# Patient Record
Sex: FEMALE | Race: WHITE | Employment: UNEMPLOYED | URBAN - METROPOLITAN AREA
[De-identification: names, ages, dates, MRNs, and addresses within clinical notes are randomized per-mention and may not be internally consistent; named-entity substitution may affect disease eponyms.]

---

## 2021-03-04 ENCOUNTER — HOSPITAL ENCOUNTER (EMERGENCY)
Age: 31
Discharge: HOME | End: 2021-03-04
Attending: EMERGENCY MEDICINE
Payer: COMMERCIAL

## 2021-03-04 ENCOUNTER — APPOINTMENT (OUTPATIENT)
Dept: CT IMAGING | Age: 31
End: 2021-03-04
Payer: COMMERCIAL

## 2021-03-04 VITALS
HEIGHT: 63 IN | DIASTOLIC BLOOD PRESSURE: 66 MMHG | SYSTOLIC BLOOD PRESSURE: 124 MMHG | OXYGEN SATURATION: 97 % | TEMPERATURE: 98.7 F | HEART RATE: 80 BPM | BODY MASS INDEX: 27.82 KG/M2 | WEIGHT: 157 LBS | RESPIRATION RATE: 20 BRPM

## 2021-03-04 DIAGNOSIS — N20.0 KIDNEY STONE: Primary | ICD-10-CM

## 2021-03-04 DIAGNOSIS — N12 PYELONEPHRITIS: ICD-10-CM

## 2021-03-04 LAB
ANION GAP SERPL CALCULATED.3IONS-SCNC: 8 MEQ/L (ref 9–15)
BACTERIA: ABNORMAL /HPF
BASOPHILS ABSOLUTE: 0 K/UL (ref 0–0.2)
BASOPHILS RELATIVE PERCENT: 0.3 %
BILIRUBIN URINE: NEGATIVE
BLOOD, URINE: ABNORMAL
BUN BLDV-MCNC: 11 MG/DL (ref 6–20)
CALCIUM SERPL-MCNC: 9.4 MG/DL (ref 8.5–9.9)
CHLORIDE BLD-SCNC: 102 MEQ/L (ref 95–107)
CLARITY: CLEAR
CO2: 28 MEQ/L (ref 20–31)
COLOR: YELLOW
CREAT SERPL-MCNC: 0.63 MG/DL (ref 0.5–0.9)
EOSINOPHILS ABSOLUTE: 0.1 K/UL (ref 0–0.7)
EOSINOPHILS RELATIVE PERCENT: 1.3 %
EPITHELIAL CELLS, UA: ABNORMAL /HPF (ref 0–5)
GFR AFRICAN AMERICAN: >60
GFR NON-AFRICAN AMERICAN: >60
GLUCOSE BLD-MCNC: 78 MG/DL (ref 70–99)
GLUCOSE URINE: NEGATIVE MG/DL
HCG(URINE) PREGNANCY TEST: NEGATIVE
HCG, URINE, POC: NEGATIVE
HCT VFR BLD CALC: 37.9 % (ref 37–47)
HEMOGLOBIN: 13 G/DL (ref 12–16)
HYALINE CASTS: ABNORMAL /HPF (ref 0–5)
KETONES, URINE: NEGATIVE MG/DL
LEUKOCYTE ESTERASE, URINE: ABNORMAL
LYMPHOCYTES ABSOLUTE: 2.4 K/UL (ref 1–4.8)
LYMPHOCYTES RELATIVE PERCENT: 29.9 %
Lab: NORMAL
MCH RBC QN AUTO: 30 PG (ref 27–31.3)
MCHC RBC AUTO-ENTMCNC: 34.3 % (ref 33–37)
MCV RBC AUTO: 87.5 FL (ref 82–100)
MONOCYTES ABSOLUTE: 0.6 K/UL (ref 0.2–0.8)
MONOCYTES RELATIVE PERCENT: 7.6 %
NEGATIVE QC PASS/FAIL: NORMAL
NEUTROPHILS ABSOLUTE: 5 K/UL (ref 1.4–6.5)
NEUTROPHILS RELATIVE PERCENT: 60.9 %
NITRITE, URINE: NEGATIVE
PDW BLD-RTO: 15.5 % (ref 11.5–14.5)
PH UA: 5.5 (ref 5–9)
PLATELET # BLD: 263 K/UL (ref 130–400)
POSITIVE QC PASS/FAIL: NORMAL
POTASSIUM SERPL-SCNC: 4.1 MEQ/L (ref 3.4–4.9)
PROTEIN UA: NEGATIVE MG/DL
RBC # BLD: 4.33 M/UL (ref 4.2–5.4)
RBC UA: ABNORMAL /HPF (ref 0–5)
SODIUM BLD-SCNC: 138 MEQ/L (ref 135–144)
SPECIFIC GRAVITY UA: 1.02 (ref 1–1.03)
UROBILINOGEN, URINE: 0.2 E.U./DL
WBC # BLD: 8.1 K/UL (ref 4.8–10.8)
WBC UA: ABNORMAL /HPF (ref 0–5)

## 2021-03-04 PROCEDURE — 6360000002 HC RX W HCPCS: Performed by: EMERGENCY MEDICINE

## 2021-03-04 PROCEDURE — 84703 CHORIONIC GONADOTROPIN ASSAY: CPT

## 2021-03-04 PROCEDURE — 2580000003 HC RX 258: Performed by: EMERGENCY MEDICINE

## 2021-03-04 PROCEDURE — 87077 CULTURE AEROBIC IDENTIFY: CPT

## 2021-03-04 PROCEDURE — 80048 BASIC METABOLIC PNL TOTAL CA: CPT

## 2021-03-04 PROCEDURE — 96365 THER/PROPH/DIAG IV INF INIT: CPT

## 2021-03-04 PROCEDURE — 87086 URINE CULTURE/COLONY COUNT: CPT

## 2021-03-04 PROCEDURE — 36415 COLL VENOUS BLD VENIPUNCTURE: CPT

## 2021-03-04 PROCEDURE — 81001 URINALYSIS AUTO W/SCOPE: CPT

## 2021-03-04 PROCEDURE — 87186 SC STD MICRODIL/AGAR DIL: CPT

## 2021-03-04 PROCEDURE — 85025 COMPLETE CBC W/AUTO DIFF WBC: CPT

## 2021-03-04 PROCEDURE — 99284 EMERGENCY DEPT VISIT MOD MDM: CPT

## 2021-03-04 PROCEDURE — 74176 CT ABD & PELVIS W/O CONTRAST: CPT

## 2021-03-04 RX ORDER — CIPROFLOXACIN 500 MG/1
500 TABLET, FILM COATED ORAL 2 TIMES DAILY
Qty: 20 TABLET | Refills: 0 | Status: SHIPPED | OUTPATIENT
Start: 2021-03-04 | End: 2021-03-14

## 2021-03-04 RX ORDER — ONDANSETRON 4 MG/1
4 TABLET, ORALLY DISINTEGRATING ORAL 3 TIMES DAILY PRN
Qty: 12 TABLET | Refills: 0 | Status: SHIPPED | OUTPATIENT
Start: 2021-03-04

## 2021-03-04 RX ORDER — HYDROCODONE BITARTRATE AND ACETAMINOPHEN 5; 325 MG/1; MG/1
1 TABLET ORAL EVERY 6 HOURS PRN
Qty: 12 TABLET | Refills: 0 | Status: SHIPPED | OUTPATIENT
Start: 2021-03-04 | End: 2021-03-07

## 2021-03-04 RX ORDER — ESCITALOPRAM OXALATE 10 MG/1
10 TABLET ORAL DAILY
COMMUNITY

## 2021-03-04 RX ADMIN — CEFTRIAXONE SODIUM 1000 MG: 1 INJECTION, POWDER, FOR SOLUTION INTRAMUSCULAR; INTRAVENOUS at 15:49

## 2021-03-04 RX ADMIN — DEXTROSE AND SODIUM CHLORIDE 1000 ML: 5; 450 INJECTION, SOLUTION INTRAVENOUS at 15:29

## 2021-03-04 ASSESSMENT — ENCOUNTER SYMPTOMS
BACK PAIN: 1
COLOR CHANGE: 0
SORE THROAT: 0
EYE PAIN: 0
SHORTNESS OF BREATH: 0
CHEST TIGHTNESS: 0
NAUSEA: 0
SINUS PAIN: 0
EYE REDNESS: 0
ABDOMINAL PAIN: 0
DIARRHEA: 0
COUGH: 0
VOMITING: 0

## 2021-03-04 ASSESSMENT — PAIN DESCRIPTION - ONSET: ONSET: ON-GOING

## 2021-03-04 ASSESSMENT — PAIN SCALES - GENERAL: PAINLEVEL_OUTOF10: 8

## 2021-03-04 NOTE — ED NOTES
Pt awake and alert. No s/s of distress noted at this time. No concerns or needs at this time. Will continue to monitor.         Becca Ward RN  03/04/21 1600

## 2021-03-04 NOTE — ED PROVIDER NOTES
[address] [facility] ED  EMERGENCY DEPARTMENT ENCOUNTER      Pt Name: Beatriz Cabral  MRN: [id number]  [place] 1990  Date of evaluation: 3/4/2021  Provider: Roberta Alamo DO    CHIEF COMPLAINT       Chief Complaint   Patient presents with    Flank Pain     onset last night, finished macrobid yesterday am for UTI     Chief complaint: Left flank pain  History of chief complaint: This 57-year-old female presents the emergency department complaining of onset with left flank pain last evening. Patient states was just a very slight dull ache that progressively worsened through the night and increased this morning. Patient denies any radiation of pain around to the abdomen or down into the groin area. Patient states she does have a history of kidney stones with similar type pains. Patient denies any associated nausea vomiting fevers chills weak or dizzy. Patient states she did have a little constipation earlier in the week but that is since resolved. Patient states she has had recent recurrent urinary tract infection initially diagnosed at the beginning of February, did a course of Bactrim with improvement. Symptoms with burning with urination recurred approximately 1 week ago saw her doctor was diagnosed again with a urinary tract infection and just finishing a 7-day course of Macrobid yesterday. Patient states no further recurrent burning with urination has not noticed any blood in the urine. Patient denies any injury or trauma to the back. States she is otherwise been well no cough cold congestion fevers chills no chest pain palpitation short of breath weak or dizzy. Nursing Notes were reviewed. REVIEW OF SYSTEMS    (2-9 systems for level 4, 10 or more for level 5)     Review of Systems   Constitutional: Negative for chills, fatigue and fever. HENT: Negative for congestion, sinus pain and sore throat. Eyes: Negative for pain and redness.    Respiratory: Negative for cough, chest tightness and shortness of breath. Cardiovascular: Negative for chest pain, palpitations and leg swelling. Gastrointestinal: Negative for abdominal pain, diarrhea, nausea and vomiting. Genitourinary: Positive for flank pain. Negative for difficulty urinating, dysuria, frequency, hematuria and pelvic pain. Musculoskeletal: Positive for back pain. Negative for myalgias. Skin: Negative for color change and rash. Neurological: Negative for weakness, light-headedness and numbness. Hematological: Negative for adenopathy. Except as noted above the remainder of the review of systems was reviewed and negative. PAST MEDICAL HISTORY     Past Medical History:   Diagnosis Date    Depression          SURGICAL HISTORY     History reviewed. No pertinent surgical history. CURRENT MEDICATIONS       Previous Medications    ESCITALOPRAM (LEXAPRO) 10 MG TABLET    Take 10 mg by mouth daily       ALLERGIES     Erythromycin and Sulfa antibiotics    FAMILY HISTORY     History reviewed. No pertinent family history.        SOCIAL HISTORY       Social History     Socioeconomic History    Marital status:      Spouse name: None    Number of children: None    Years of education: None    Highest education level: None   Occupational History    None   Social Needs    Financial resource strain: None    Food insecurity     Worry: None     Inability: None    Transportation needs     Medical: None     Non-medical: None   Tobacco Use    Smoking status: Current Every Day Smoker     Packs/day: 0.50     Types: Cigarettes    Smokeless tobacco: Never Used   Substance and Sexual Activity    Alcohol use: None     Comment: never    Drug use: Never    Sexual activity: None   Lifestyle    Physical activity     Days per week: None     Minutes per session: None    Stress: None   Relationships    Social connections     Talks on phone: None     Gets together: None     Attends [employer] service: None     Active member of emergency physician with the below findings:    Interpretation per the Radiologist below, if available at the time of this note:    CT ABDOMEN PELVIS WO CONTRAST Additional Contrast? None   Final Result   1. BILATERAL NEPHROLITHIASIS. 2. MILD LEFT-SIDED HYDRONEPHROSIS. 3. LEFT CYSTIC ADNEXAL MASSES. LIKELY OVARIAN. IF CLINICALLY INDICATED CONSIDER FOLLOW-UP ULTRASOUND         All CT scans at this facility use dose modulation, iterative reconstruction, and/or weight based dosing when appropriate to reduce radiation dose to as low as reasonably achievable. LABS:  Labs Reviewed   BASIC METABOLIC PANEL - Abnormal; Notable for the following components:       Result Value    Anion Gap 8 (*)     All other components within normal limits   CBC WITH AUTO DIFFERENTIAL - Abnormal; Notable for the following components:    RDW 15.5 (*)     All other components within normal limits   URINALYSIS - Abnormal; Notable for the following components:    Blood, Urine TRACE (*)     Leukocyte Esterase, Urine MODERATE (*)     All other components within normal limits   MICROSCOPIC URINALYSIS - Abnormal; Notable for the following components:    Bacteria, UA FEW (*)     WBC, UA  (*)     RBC, UA 6-10 (*)     All other components within normal limits   CULTURE, URINE   PREGNANCY, URINE   POC PREGNANCY UR-QUAL       All other labs were within normal range or not returned as of this dictation.     EMERGENCY DEPARTMENT COURSE and DIFFERENTIAL DIAGNOSIS/MDM:   Vitals:    Vitals:    03/04/21 1423 03/04/21 1641 03/04/21 1731   BP: 137/78 120/74 124/61   Pulse: 79 83 80   Resp: 18 20    Temp: 98.7 °F (37.1 °C)     TempSrc: Oral     SpO2: 99% 100% 97%   Weight: 157 lb (71.2 kg)     Height: 5' 3\" (1.6 m)         Treatment and course: Patient had an IV established normal saline 1 L was given along with Toradol 30 mg IV with improvement, Rocephin 1 g IV was initiated with urine findings and left hydronephrosis concerning for the possibility of some pyelonephritis. A call was placed out to urology in case was discussed with Dr. Shania Pineda with concern for recurrent infectious findings and kidney stone with the left hydronephrosis, recommended patient home on Cipro and will follow up in the office    Patient is well-appearing nontoxic no leukocytosis afebrile, agreeable and comfortable with the plan for outpatient follow-up with urology      FINAL IMPRESSION      1. Kidney stone    2. Pyelonephritis          DISPOSITION/PLAN   DISPOSITION Decision To Discharge 03/04/2021 05:52:12 PM  Patient discharged home with family given home-going prescription for Cipro 10 days Zofran and Vicodin No. 12.  Patient advised to increase fluid monitor closely and return if any change or worsening increasing pain high fever persistent vomiting weak or dizzy feeling. Patient to call urology office in the morning for an appointment in the next 2 to 3 days. PATIENT REFERRED TO:  Aleshia Cardoza MD  [address]  [phone]    In 2 days        DISCHARGE MEDICATIONS:  New Prescriptions    CIPROFLOXACIN (CIPRO) 500 MG TABLET    Take 1 tablet by mouth 2 times daily for 10 days    HYDROCODONE-ACETAMINOPHEN (NORCO) 5-325 MG PER TABLET    Take 1 tablet by mouth every 6 hours as needed for Pain for up to 3 days. Intended supply: 3 days. Take lowest dose possible to manage pain    ONDANSETRON (ZOFRAN-ODT) 4 MG DISINTEGRATING TABLET    Take 1 tablet by mouth 3 times daily as needed for Nausea or Vomiting     Controlled Substances Monitoring:     No flowsheet data found.     (Please note that portions of this note were completed with a voice recognition program.  Efforts were made to edit the dictations but occasionally words are mis-transcribed.)    Rc Ch DO (electronically signed)  Attending Emergency Physician          Rc Ch DO  03/04/21 1800

## 2021-03-04 NOTE — ED NOTES
Discharge education reviewed verbally and in writing. Instructed to follow up with PCP and come back to the ED with any new or worsening symptoms. No questions or concerns at this time. No s/s of distress noted at this time.         Carlito Gonsalves RN  03/04/21 2362

## 2021-03-04 NOTE — ED NOTES
Pt awake and alert. No s/s of distress noted at this time. No concerns or needs at this time. Will continue to monitor.         Spencer Gomez RN  03/04/21 8494

## 2021-03-04 NOTE — ED NOTES
Pt's urine specimen collected.    Tubes labeled and sent to lab  Tube system down, specimen walked by ed staff     Kennedy Brady RN  03/04/21 6413

## 2021-03-07 ENCOUNTER — HOSPITAL ENCOUNTER (EMERGENCY)
Age: 31
Discharge: HOME | End: 2021-03-07
Attending: FAMILY MEDICINE
Payer: COMMERCIAL

## 2021-03-07 VITALS
OXYGEN SATURATION: 100 % | BODY MASS INDEX: 26.57 KG/M2 | WEIGHT: 150 LBS | TEMPERATURE: 98.7 F | RESPIRATION RATE: 16 BRPM | SYSTOLIC BLOOD PRESSURE: 120 MMHG | DIASTOLIC BLOOD PRESSURE: 73 MMHG | HEART RATE: 76 BPM

## 2021-03-07 DIAGNOSIS — N39.0 ACUTE UTI (URINARY TRACT INFECTION): Primary | ICD-10-CM

## 2021-03-07 LAB
ALBUMIN SERPL-MCNC: 4.6 G/DL (ref 3.5–4.6)
ALP BLD-CCNC: 52 U/L (ref 40–130)
ALT SERPL-CCNC: 30 U/L (ref 0–33)
ANION GAP SERPL CALCULATED.3IONS-SCNC: 10 MEQ/L (ref 9–15)
AST SERPL-CCNC: 20 U/L (ref 0–35)
BACTERIA: NEGATIVE /HPF
BASOPHILS ABSOLUTE: 0 K/UL (ref 0–0.2)
BASOPHILS RELATIVE PERCENT: 0.3 %
BILIRUB SERPL-MCNC: 0.3 MG/DL (ref 0.2–0.7)
BILIRUBIN URINE: NEGATIVE
BLOOD, URINE: NEGATIVE
BUN BLDV-MCNC: 12 MG/DL (ref 6–20)
CALCIUM SERPL-MCNC: 10.1 MG/DL (ref 8.5–9.9)
CHLORIDE BLD-SCNC: 103 MEQ/L (ref 95–107)
CLARITY: CLEAR
CO2: 26 MEQ/L (ref 20–31)
COLOR: ABNORMAL
CREAT SERPL-MCNC: 0.59 MG/DL (ref 0.5–0.9)
EOSINOPHILS ABSOLUTE: 0.1 K/UL (ref 0–0.7)
EOSINOPHILS RELATIVE PERCENT: 1.3 %
EPITHELIAL CELLS, UA: ABNORMAL /HPF (ref 0–5)
GFR AFRICAN AMERICAN: >60
GFR NON-AFRICAN AMERICAN: >60
GLOBULIN: 3.3 G/DL (ref 2.3–3.5)
GLUCOSE BLD-MCNC: 65 MG/DL (ref 70–99)
GLUCOSE URINE: NEGATIVE MG/DL
HCT VFR BLD CALC: 40.6 % (ref 37–47)
HEMOGLOBIN: 14.1 G/DL (ref 12–16)
HYALINE CASTS: ABNORMAL /HPF (ref 0–5)
KETONES, URINE: NEGATIVE MG/DL
LEUKOCYTE ESTERASE, URINE: ABNORMAL
LYMPHOCYTES ABSOLUTE: 2.1 K/UL (ref 1–4.8)
LYMPHOCYTES RELATIVE PERCENT: 28.1 %
MCH RBC QN AUTO: 30.4 PG (ref 27–31.3)
MCHC RBC AUTO-ENTMCNC: 34.9 % (ref 33–37)
MCV RBC AUTO: 87.2 FL (ref 82–100)
MONOCYTES ABSOLUTE: 0.5 K/UL (ref 0.2–0.8)
MONOCYTES RELATIVE PERCENT: 6.8 %
NEUTROPHILS ABSOLUTE: 4.6 K/UL (ref 1.4–6.5)
NEUTROPHILS RELATIVE PERCENT: 63.5 %
NITRITE, URINE: NEGATIVE
ORGANISM: ABNORMAL
PDW BLD-RTO: 15.4 % (ref 11.5–14.5)
PH UA: 8 (ref 5–9)
PLATELET # BLD: 275 K/UL (ref 130–400)
POTASSIUM SERPL-SCNC: 3.7 MEQ/L (ref 3.4–4.9)
PROTEIN UA: NEGATIVE MG/DL
RBC # BLD: 4.66 M/UL (ref 4.2–5.4)
RBC UA: ABNORMAL /HPF (ref 0–5)
SODIUM BLD-SCNC: 139 MEQ/L (ref 135–144)
SPECIFIC GRAVITY UA: 1 (ref 1–1.03)
TOTAL PROTEIN: 7.9 G/DL (ref 6.3–8)
URINE CULTURE, ROUTINE: ABNORMAL
URINE REFLEX TO CULTURE: ABNORMAL
UROBILINOGEN, URINE: 0.2 E.U./DL
WBC # BLD: 7.3 K/UL (ref 4.8–10.8)
WBC UA: ABNORMAL /HPF (ref 0–5)

## 2021-03-07 PROCEDURE — 80053 COMPREHEN METABOLIC PANEL: CPT

## 2021-03-07 PROCEDURE — 85025 COMPLETE CBC W/AUTO DIFF WBC: CPT

## 2021-03-07 PROCEDURE — 2580000003 HC RX 258: Performed by: FAMILY MEDICINE

## 2021-03-07 PROCEDURE — 81001 URINALYSIS AUTO W/SCOPE: CPT

## 2021-03-07 PROCEDURE — 36415 COLL VENOUS BLD VENIPUNCTURE: CPT

## 2021-03-07 PROCEDURE — 99284 EMERGENCY DEPT VISIT MOD MDM: CPT

## 2021-03-07 PROCEDURE — 6360000002 HC RX W HCPCS: Performed by: FAMILY MEDICINE

## 2021-03-07 PROCEDURE — 96365 THER/PROPH/DIAG IV INF INIT: CPT

## 2021-03-07 RX ORDER — NITROFURANTOIN 25; 75 MG/1; MG/1
100 CAPSULE ORAL 2 TIMES DAILY
Qty: 10 CAPSULE | Refills: 0 | Status: SHIPPED | OUTPATIENT
Start: 2021-03-07 | End: 2021-03-12

## 2021-03-07 RX ADMIN — CEFTRIAXONE SODIUM 1000 MG: 1 INJECTION, POWDER, FOR SOLUTION INTRAMUSCULAR; INTRAVENOUS at 11:38

## 2021-03-07 NOTE — ED PROVIDER NOTES
[address] [facility] ED  eMERGENCY dEPARTMENT eNCOUnter      Pt Name: Brian Hunter  MRN: [id number]  [place] 1990  Date of evaluation: 3/7/2021  Provider: Adrian Gonsales MD    CHIEF COMPLAINT       Chief Complaint   Patient presents with    Urinary Tract Infection     called squad  because she thinks she has a uti         HISTORY OF PRESENT ILLNESS   (Location/Symptom, Timing/Onset,Context/Setting, Quality, Duration, Modifying Factors, Severity)  Note limiting factors. Brian Hunter is a 32 y.o. female who presents to the emergency department UTI    3years old female patient currently lives in [facility] presented to the ER today to follow-up on a UTI with seen on March 4 for a UTI was given Rocephin in the ER sent home on Cipro states she continued to have severe dysuria burning this morning culture reviewed was positive for E. coli that was unfortunately resistant to Cipro but sensitive to cephalosporins and Macrobid. The history is provided by the patient. Urinary Tract Infection  This is a new problem. The current episode started more than 1 week ago. The problem occurs constantly. The problem has not changed since onset. Nothing aggravates the symptoms. Nothing relieves the symptoms. She has tried nothing for the symptoms. The treatment provided no relief. NursingNotes were reviewed. REVIEW OF SYSTEMS    (2-9 systems for level 4, 10 or more for level 5)     Review of Systems    Except as noted above the remainder of the review of systems was reviewed and negative. PAST MEDICAL HISTORY     Past Medical History:   Diagnosis Date    Depression          SURGICALHISTORY     No past surgical history on file.       CURRENT MEDICATIONS       Previous Medications    CIPROFLOXACIN (CIPRO) 500 MG TABLET    Take 1 tablet by mouth 2 times daily for 10 days    ESCITALOPRAM (LEXAPRO) 10 MG TABLET    Take 10 mg by mouth daily    HYDROCODONE-ACETAMINOPHEN (NORCO) 5-325 MG PER TABLET    Take 1 tablet by mouth every 6 hours as needed for Pain for up to 3 days. Intended supply: 3 days. Take lowest dose possible to manage pain    ONDANSETRON (ZOFRAN-ODT) 4 MG DISINTEGRATING TABLET    Take 1 tablet by mouth 3 times daily as needed for Nausea or Vomiting       ALLERGIES     Erythromycin and Sulfa antibiotics    FAMILY HISTORY     No family history on file.        SOCIAL HISTORY       Social History     Socioeconomic History    Marital status:      Spouse name: Not on file    Number of children: Not on file    Years of education: Not on file    Highest education level: Not on file   Occupational History    Not on file   Social Needs    Financial resource strain: Not on file    Food insecurity     Worry: Not on file     Inability: Not on file    Transportation needs     Medical: Not on file     Non-medical: Not on file   Tobacco Use    Smoking status: Current Every Day Smoker     Packs/day: 0.50     Types: Cigarettes    Smokeless tobacco: Never Used   Substance and Sexual Activity    Alcohol use: Not on file     Comment: never    Drug use: Never    Sexual activity: Not on file   Lifestyle    Physical activity     Days per week: Not on file     Minutes per session: Not on file    Stress: Not on file   Relationships    Social connections     Talks on phone: Not on file     Gets together: Not on file     Attends Hinduism service: Not on file     Active member of club or organization: Not on file     Attends meetings of clubs or organizations: Not on file     Relationship status: Not on file    Intimate partner violence     Fear of current or ex partner: Not on file     Emotionally abused: Not on file     Physically abused: Not on file     Forced sexual activity: Not on file   Other Topics Concern    Not on file   Social History Narrative    Not on file       SCREENINGS      @[facility]([id number])@      PHYSICAL EXAM    (up to 7 for level 4, 8 or more for level 5)     ED Triage Vitals [03/07/21 1124]   BP Temp Temp src Pulse Resp SpO2 Height Weight   138/74 98.7 °F (37.1 °C) -- 76 16 100 % -- 150 lb (68 kg)       Physical Exam  Vitals signs and nursing note reviewed. Constitutional:       Appearance: Normal appearance. She is well-developed. HENT:      Head: Normocephalic and atraumatic. Right Ear: Tympanic membrane and external ear normal.      Left Ear: Tympanic membrane and external ear normal.      Nose: Nose normal.      Mouth/Throat:      Mouth: Mucous membranes are moist.      Pharynx: Oropharynx is clear. Eyes:      Extraocular Movements: Extraocular movements intact. Pupils: Pupils are equal, round, and reactive to light. Neck:      Musculoskeletal: Normal range of motion and neck supple. Cardiovascular:      Rate and Rhythm: Normal rate and regular rhythm. Heart sounds: Normal heart sounds. Pulmonary:      Effort: Pulmonary effort is normal. No respiratory distress. Breath sounds: Normal breath sounds. No stridor. No wheezing, rhonchi or rales. Chest:      Chest wall: No tenderness. Abdominal:      General: Abdomen is flat. Bowel sounds are normal.      Palpations: Abdomen is soft. Tenderness: There is no right CVA tenderness or left CVA tenderness. Musculoskeletal: Normal range of motion. Skin:     General: Skin is warm and dry. Neurological:      General: No focal deficit present. Mental Status: She is alert and oriented to person, place, and time. Cranial Nerves: No cranial nerve deficit. Sensory: No sensory deficit. Motor: No weakness or abnormal muscle tone. Coordination: Coordination normal.      Gait: Gait normal.      Deep Tendon Reflexes: Reflexes normal.   Psychiatric:         Behavior: Behavior normal.         Thought Content:  Thought content normal.         Judgment: Judgment normal.         DIAGNOSTIC RESULTS     EKG: All EKG's are interpreted by the Emergency Department Physician who either signs or Co-signsthis chart in the absence of a cardiologist.        RADIOLOGY:   Creed Hawking such as CT, Ultrasound and MRI are read by the radiologist. Plain radiographic images are visualized and preliminarily interpreted by the emergency physician with the below findings:        Interpretation per the Radiologist below, if available at the time ofthis note:    No orders to display         ED BEDSIDE ULTRASOUND:   Performed by ED Physician - none    LABS:  Labs Reviewed   COMPREHENSIVE METABOLIC PANEL - Abnormal; Notable for the following components:       Result Value    Glucose 65 (*)     Calcium 10.1 (*)     All other components within normal limits   CBC WITH AUTO DIFFERENTIAL - Abnormal; Notable for the following components:    RDW 15.4 (*)     All other components within normal limits   URINE RT REFLEX TO CULTURE - Abnormal; Notable for the following components:    Color, UA DARK YELLOW (*)     Leukocyte Esterase, Urine MODERATE (*)     All other components within normal limits   MICROSCOPIC URINALYSIS - Abnormal; Notable for the following components:    WBC, UA 6-9 (*)     All other components within normal limits       All other labs were within normal range or not returned as of this dictation. [facility] Ananda Mcgill MD [address] [address] 07/12/18 0959-Present   Narrative  Performed by: [facility]  ORDER#: [id number]                          ORDERED BY: Freedom Sterling   SOURCE: Urine Clean Catch                  COLLECTED:  03/04/21 15:00   ANTIBIOTICS AT [facility]. :                      RECEIVED :  03/04/21 15:49   Susceptibility    Escherichia coli (1)    Antibiotic Interpretation SHASTA Status    amoxicillin-clavulanate Sensitive 4 mcg/mL     ampicillin Resistant >=32 mcg/mL     ceFAZolin Sensitive <=4 mcg/mL     ciprofloxacin Resistant >=4 mcg/mL     gentamicin Sensitive <=1 mcg/mL     levofloxacin Resistant >=8 mcg/mL     nitrofurantoin Sensitive <=16 mcg/mL     trimethoprim-sulfamethoxazole Resistant >=320 mcg/mL     Lab and Collection    Culture, Urine - 3/4/2021    EMERGENCY DEPARTMENT COURSE and DIFFERENTIAL DIAGNOSIS/MDM:   Vitals:    Vitals:    03/07/21 1124 03/07/21 1230   BP: 138/74 120/73   Pulse: 76    Resp: 16    Temp: 98.7 °F (37.1 °C)    SpO2: 100%    Weight: 150 lb (68 kg)                 MDM  Number of Diagnoses or Management Options  Diagnosis management comments: 32years old presents with persistent urinary tract infection symptoms started on March 4 was seen in the ER had a urine culture. Culture result from C4 was reviewed positive for E. coli that sensitive to cephalosporin and Macrobid but resistant to Cipro. Patient was given first dose of Rocephin in the ER sent home with a prescription for Macrobid. Repeat lab today was normal WBCs urine still with positive bacteria and WBCs. Patient hemodynamically stable afebrile no systemic symptoms       Amount and/or Complexity of Data Reviewed  Clinical lab tests: ordered and reviewed         CONSULTS:  None    PROCEDURES:  Unless otherwise noted below, none     Procedures    FINAL IMPRESSION      1. Acute UTI (urinary tract infection)          DISPOSITION/PLAN   DISPOSITION Decision To Discharge 03/07/2021 12:39:49 PM      PATIENT REFERRED TO:  No follow-up provider specified.     DISCHARGE MEDICATIONS:  New Prescriptions    NITROFURANTOIN, MACROCRYSTAL-MONOHYDRATE, (MACROBID) 100 MG CAPSULE    Take 1 capsule by mouth 2 times daily for 5 days          (Please note thatportions of this note were completed with a voice recognition program.  Efforts were made to edit the dictations but occasionally words are mis-transcribed.)    Alcira Mead MD (electronically signed)  Attending Emergency Physician        Carol Mei MD  03/07/21 [phone]

## 2021-03-07 NOTE — CARE COORDINATION
This CM was notified by unit secretary that when patient was ready for Sayra Johnny was to be contacted for transport at [phone]. Patient up for DC at 1240. Attempted to call the number provided - number was non-working/disconnected. ER CM also contacted the following numbers specifically noted to be transport services (found on [facility] paperwork or suggested by patient): [phone], [phone], and [phone]. Messages left when option was available. Calls were made at:  [phone] (message left)  [phone] (message left)  [phone]    At 1342, this CM got in touch with [facility] staff who will be here to pick patient up in approximately 15 minutes. CM's phone number given to be called when transport arrives. Patient and ER RN updated.

## 2021-03-07 NOTE — ED NOTES
Bed: 16  Expected date: 3/7/21  Expected time: 11:15 AM  Means of arrival: [facility]  Comments:  31 yo female  UTI   140/86 HR 78 100% RA  18 L AC with fluids

## 2023-04-06 LAB
AMPHETAMINE (PRESENCE) IN URINE BY SCREEN METHOD: NORMAL
BARBITURATES PRESENCE IN URINE BY SCREEN METHOD: NORMAL
BENZODIAZEPINE (PRESENCE) IN URINE BY SCREEN METHOD: NORMAL
CANNABINOIDS IN URINE BY SCREEN METHOD: NORMAL
COCAINE (PRESENCE) IN URINE BY SCREEN METHOD: NORMAL
DRUG SCREEN COMMENT URINE: NORMAL
FENTANYL URINE: NORMAL
METHADONE (PRESENCE) IN URINE BY SCREEN METHOD: NORMAL
OPIATES (PRESENCE) IN URINE BY SCREEN METHOD: NORMAL
OXYCODONE (PRESENCE) IN URINE BY SCREEN METHOD: NORMAL
PHENCYCLIDINE (PRESENCE) IN URINE BY SCREEN METHOD: NORMAL

## 2023-04-08 LAB — STAPH/MRSA SCREEN, CULTURE: ABNORMAL

## 2023-04-18 ENCOUNTER — HOSPITAL ENCOUNTER (OUTPATIENT)
Dept: DATA CONVERSION | Facility: HOSPITAL | Age: 33
End: 2023-04-18
Attending: ORTHOPAEDIC SURGERY | Admitting: ORTHOPAEDIC SURGERY

## 2023-04-18 DIAGNOSIS — F43.10 POST-TRAUMATIC STRESS DISORDER, UNSPECIFIED: ICD-10-CM

## 2023-04-18 DIAGNOSIS — Z79.82 LONG TERM (CURRENT) USE OF ASPIRIN: ICD-10-CM

## 2023-04-18 DIAGNOSIS — Z88.0 ALLERGY STATUS TO PENICILLIN: ICD-10-CM

## 2023-04-18 DIAGNOSIS — Z88.2 ALLERGY STATUS TO SULFONAMIDES: ICD-10-CM

## 2023-04-18 DIAGNOSIS — M19.079 PRIMARY OSTEOARTHRITIS, UNSPECIFIED ANKLE AND FOOT: ICD-10-CM

## 2023-04-18 DIAGNOSIS — M96.0 PSEUDARTHROSIS AFTER FUSION OR ARTHRODESIS: ICD-10-CM

## 2023-09-07 VITALS — BODY MASS INDEX: 36.21 KG/M2 | WEIGHT: 204.37 LBS | HEIGHT: 63 IN

## 2023-09-14 NOTE — H&P
History & Physical Reviewed:   Pregnant/Lactating:  ·  Are You Pregnant no   ·  Are You Currently Breastfeeding no     I have reviewed the History and Physical dated:  06-Apr-2023   History and Physical reviewed and relevant findings noted. Patient examined to review pertinent physical  findings.: No significant changes   Home Medications Reviewed: no changes noted   Allergies Reviewed: no changes noted       ERAS (Enhanced Recovery After Surgery):  ·  ERAS Patient: no     Consent:   COVID-19 Consent:  ·  COVID-19 Risk Consent Surgeon has reviewed key risks related to the risk of saniya COVID-19 and if they contract COVID-19 what the risks are.       Electronic Signatures:  Romario Tee)  (Signed 17-Apr-2023 09:26)   Authored: History & Physical Reviewed, ERAS, Consent,  Note Completion      Last Updated: 17-Apr-2023 09:26 by Romario Tee)

## 2023-10-02 NOTE — OP NOTE
Post Operative Note:     Post-Procedure Diagnosis: Left subtalar fusion nonunion   Procedure: 1.  Revision left subtalar fusion  2.  Left iliac crest bone graft  3.  Hardware removal  4.   5.   Surgeon: Nay   Resident/Fellow/Other Assistant: Sabino Grossman   Anesthesia: General and regional   Estimated Blood Loss (mL): none   Specimen: no   Findings: See above diagnosis     Operative Report Dictated:  Dictation: not applicable - note contains Operative  Report   Operative Report:    Preop DX:   Left subtalar fusion nonunion  Postop DX: Same  Procedure: Revision left subtalar arthrodesis; left iliac crest bone graft; left hindfoot hardware removal  Surgeon: Romario Tee MD  Asst: Anitra Grossman DO; Kahlil Gallo DO  Anesthesia: General and regionalAnd local for hip  Clinical Note: 33 year-old female with the above diagnoses.  Failed conservative treatment.  CT scan confirmed nonunion.  Daily functional pain.  Here for revision subtalar fusion with hardware removal and iliac crest bone graft.  Understood risk of surgery  including but not limited to bleeding, infection, nonunion, malunion, wound healing problems, DVT, possible need for further surgery or bracing.  Understood these risks and wished to proceed.  Procedure Note: Patient brought to operating room after regional anesthesia.  Timeout performed.  Antibiotics given IV.   General anesthesia given.  Left leg prepped and draped in typical sterile fashion with tourniquet on the calf.  Leg was exsanguinated  and calf tourniquet was laying to 275 mmHg.  Previous incision over the anterior talus and posterior heel were incised.  Guidepins were placed down the cannulated screws under C-arm imaging and screws were removed.  Sinus Tarsi incision was then opened  up laterally using the previous scar.  The  subtalar joint was exposed and prepared for fusion using combination of rongeur, curette, osteotome and bur.  Plan was for an intercalary bone  graft as well as cancellous bone graft.  Good bleeding bone was  exposed.   Left iliac crest was marked.  Inject with Marcaine and lidocaine.  Longitudinal incision was made blunt dissection down to the periosteum.  Self-retaining retractors were placed.  Periosteum incised off the bone using Bovie.  10 mm tricortical iliac crest  graft was harvested.  Cancellous bone was harvested from the inner table.  Hindfoot was placed in the fusion  position with the subtalar joint in 5 degrees of valgus.  The tricortical graft fit nicely in the posterior aspect of the posterior facet.  Cancellous  bone chips were then packed around it.  Subtalar joint was fixed with 2 crossed headless screws through small anterior and posterior incisions.  Wounds were irrigated.  Wounds were closed in layers.  Dressed with posterior splint and sugar tong.  Patient  tolerated procedure well and taken recovery room in stable condition.      Electronic Signatures:  Romario Tee)  (Signed 18-Apr-2023 10:24)   Authored: Post Operative Note, Note Completion      Last Updated: 18-Apr-2023 10:24 by Romario Tee)

## 2023-11-05 ENCOUNTER — HOSPITAL ENCOUNTER (OUTPATIENT)
Dept: RADIOLOGY | Facility: HOSPITAL | Age: 33
Discharge: HOME | End: 2023-11-05
Payer: COMMERCIAL

## 2023-11-05 DIAGNOSIS — M23.90 UNSPECIFIED INTERNAL DERANGEMENT OF UNSPECIFIED KNEE: ICD-10-CM

## 2023-11-09 ENCOUNTER — ANCILLARY PROCEDURE (OUTPATIENT)
Dept: RADIOLOGY | Facility: CLINIC | Age: 33
End: 2023-11-09
Payer: MEDICAID

## 2023-11-09 PROCEDURE — 73721 MRI JNT OF LWR EXTRE W/O DYE: CPT | Mod: LEFT SIDE | Performed by: RADIOLOGY

## 2023-11-09 PROCEDURE — 73721 MRI JNT OF LWR EXTRE W/O DYE: CPT | Mod: LT

## 2024-02-23 ENCOUNTER — OFFICE VISIT (OUTPATIENT)
Dept: PRIMARY CARE | Facility: CLINIC | Age: 34
End: 2024-02-23
Payer: MEDICAID

## 2024-02-23 VITALS
DIASTOLIC BLOOD PRESSURE: 80 MMHG | HEART RATE: 88 BPM | TEMPERATURE: 98 F | HEIGHT: 64 IN | RESPIRATION RATE: 16 BRPM | SYSTOLIC BLOOD PRESSURE: 128 MMHG | WEIGHT: 229 LBS | OXYGEN SATURATION: 100 % | BODY MASS INDEX: 39.09 KG/M2

## 2024-02-23 DIAGNOSIS — Z13.21 ENCOUNTER FOR VITAMIN DEFICIENCY SCREENING: ICD-10-CM

## 2024-02-23 DIAGNOSIS — Z13.220 SCREENING FOR LIPOID DISORDERS: ICD-10-CM

## 2024-02-23 DIAGNOSIS — F43.9 STRESS: ICD-10-CM

## 2024-02-23 DIAGNOSIS — Z12.31 SCREENING MAMMOGRAM FOR BREAST CANCER: ICD-10-CM

## 2024-02-23 DIAGNOSIS — M25.561 ACUTE PAIN OF RIGHT KNEE: Primary | ICD-10-CM

## 2024-02-23 DIAGNOSIS — R53.83 OTHER FATIGUE: ICD-10-CM

## 2024-02-23 PROCEDURE — 99214 OFFICE O/P EST MOD 30 MIN: CPT | Performed by: NURSE PRACTITIONER

## 2024-02-23 RX ORDER — NEOMYCIN SULFATE, POLYMYXIN B SULFATE, AND DEXAMETHASONE 3.5; 10000; 1 MG/G; [USP'U]/G; MG/G
OINTMENT OPHTHALMIC
COMMUNITY
Start: 2024-02-21

## 2024-02-23 RX ORDER — DICLOFENAC SODIUM 75 MG/1
75 TABLET, DELAYED RELEASE ORAL 2 TIMES DAILY PRN
Qty: 60 TABLET | Refills: 0 | Status: SHIPPED | OUTPATIENT
Start: 2024-02-23 | End: 2024-03-21

## 2024-02-23 RX ORDER — VENLAFAXINE HYDROCHLORIDE 225 MG/1
225 TABLET, EXTENDED RELEASE ORAL DAILY
COMMUNITY
End: 2024-05-31 | Stop reason: SDUPTHER

## 2024-02-23 ASSESSMENT — ENCOUNTER SYMPTOMS
NUMBNESS: 0
VOMITING: 0
MYALGIAS: 1
LOSS OF SENSATION IN FEET: 0
SHORTNESS OF BREATH: 0
FEVER: 0
FATIGUE: 1
OCCASIONAL FEELINGS OF UNSTEADINESS: 0
DEPRESSION: 0
PALPITATIONS: 0
CHILLS: 0
WHEEZING: 0
WEAKNESS: 0
UNEXPECTED WEIGHT CHANGE: 1
DIARRHEA: 0
CONSTIPATION: 0
DIZZINESS: 0
ABDOMINAL PAIN: 0

## 2024-02-23 NOTE — PROGRESS NOTES
Subjective   Patient ID: Alesia Fierro is a 34 y.o. female who presents for Weight Management (Patient presents in office with a complaint of weight management. Patient would like to look into assistance with losing weight. Patient admits after giving birth to children she has been experiencing difficulties with weight loss. ) and Knee Pain (Patient presents in office with a complaint of knee pain. Patient admits she has been experiencing a reoccurring pain in her left knee. Patient has previously sprained her knee. Patient is currently not taking anything for relief. ).    HPI     34-year-old female presents today to establish.  She is concerned with her inability to lose weight.  She states that ever since having kids 9 years ago, she has had a steady increase in her weight.  She does have 3 children.  She states that she believes her diet is healthy.  She eats all home-cooked meals.  She eats a lot of chicken, veggies and fruits.  She does not get a lot of regular exercise.  She does walk around her house and property, but states that she feels her exercise is lacking.  She is also complaining of left knee pain that has been persisting for the past several months.  She was seen by Ortho and diagnosed with a PCL sprain on MRI.  She was prescribed a steroid pack, but states that she got no relief with the medication.  Gentle pressure over the knee seems to bother her.  She denies any feeling of the knee wanting to lock up or give out on her.  She does have an appointment scheduled next month with her GYN, she is getting a Pap at that time.  She also is requesting an order for mammogram.  She does have a strong family history of breast cancer on her mom side of the family with both her maternal grandmother and maternal aunt diagnosed with breast cancer at a young age.  She does see psychiatry for her anxiety/depression.  She is currently seeing counseling as well.  She does feel that she carries much of her stress  "in her neck/shoulders.  She is requesting a referral for a massage.    Review of Systems   Constitutional:  Positive for fatigue and unexpected weight change. Negative for chills and fever.   Respiratory:  Negative for shortness of breath and wheezing.    Cardiovascular:  Negative for chest pain and palpitations.   Gastrointestinal:  Negative for abdominal pain, constipation, diarrhea and vomiting.   Musculoskeletal:  Positive for myalgias.        Left knee pain   Neurological:  Negative for dizziness, weakness and numbness.       Objective   /80 (BP Location: Left arm, Patient Position: Sitting, BP Cuff Size: Large adult)   Pulse 88   Temp 36.7 °C (98 °F) (Temporal)   Resp 16   Ht 1.619 m (5' 3.75\")   Wt 104 kg (229 lb)   SpO2 100%   BMI 39.62 kg/m²     Physical Exam  Constitutional:       Appearance: Normal appearance. She is obese.   HENT:      Right Ear: Tympanic membrane normal.      Left Ear: Tympanic membrane normal.      Nose: No congestion.      Mouth/Throat:      Pharynx: No oropharyngeal exudate or posterior oropharyngeal erythema.   Eyes:      Conjunctiva/sclera: Conjunctivae normal.      Pupils: Pupils are equal, round, and reactive to light.   Cardiovascular:      Rate and Rhythm: Normal rate and regular rhythm.      Heart sounds: Normal heart sounds.   Pulmonary:      Effort: Pulmonary effort is normal.      Breath sounds: Normal breath sounds.   Abdominal:      General: Abdomen is flat. Bowel sounds are normal.      Palpations: Abdomen is soft.      Tenderness: There is no abdominal tenderness.   Musculoskeletal:      Comments: LEFT KNEE: No erythema, edema, ecchymoses or other visible deformity. No tenderness to palpation. ROM wnl.   Anterior/Posterior drawer test: NEG  Ceferino's test: NEG   Lymphadenopathy:      Cervical: No cervical adenopathy.   Skin:     General: Skin is warm and dry.   Neurological:      Mental Status: She is alert.   Psychiatric:         Mood and Affect: Mood " normal.         Behavior: Behavior normal.       Assessment/Plan   Problem List Items Addressed This Visit    None  Visit Diagnoses         Codes    Acute pain of right knee    -  Primary M25.561    Relevant Medications    diclofenac (Voltaren) 75 mg EC tablet    Other fatigue     R53.83    Relevant Orders    Tsh With Reflex To Free T4 If Abnormal    CBC and Auto Differential    Comprehensive Metabolic Panel    Screening for lipoid disorders     Z13.220    Relevant Orders    Lipid panel    Encounter for vitamin deficiency screening     Z13.21    Relevant Orders    Vitamin D 25-Hydroxy,Total (for eval of Vitamin D levels)    Screening mammogram for breast cancer     Z12.31    Relevant Orders    BI mammo bilateral screening tomosynthesis    BMI 39.0-39.9,adult     Z68.39    Stress     F43.9    Relevant Orders    Referral to [facility]          Check labs and screening mammogram. MRI from 11/23 reviewed. Start Diclofenac for knee pain. Referral provided for massage therapy. Follow up in 2 weeks for recheck/lab review, or sooner with any additional concerns.

## 2024-02-24 ENCOUNTER — LAB (OUTPATIENT)
Dept: LAB | Facility: LAB | Age: 34
End: 2024-02-24
Payer: MEDICAID

## 2024-02-24 DIAGNOSIS — Z13.21 ENCOUNTER FOR VITAMIN DEFICIENCY SCREENING: ICD-10-CM

## 2024-02-24 DIAGNOSIS — R53.83 OTHER FATIGUE: ICD-10-CM

## 2024-02-24 DIAGNOSIS — Z13.220 SCREENING FOR LIPOID DISORDERS: ICD-10-CM

## 2024-02-24 LAB
25(OH)D3 SERPL-MCNC: 14 NG/ML (ref 30–100)
ALBUMIN SERPL BCP-MCNC: 4.4 G/DL (ref 3.4–5)
ALP SERPL-CCNC: 71 U/L (ref 33–110)
ALT SERPL W P-5'-P-CCNC: 15 U/L (ref 7–45)
ANION GAP SERPL CALC-SCNC: 10 MMOL/L (ref 10–20)
AST SERPL W P-5'-P-CCNC: 17 U/L (ref 9–39)
BASOPHILS # BLD AUTO: 0 X10*3/UL (ref 0–0.1)
BASOPHILS NFR BLD AUTO: 0 %
BILIRUB SERPL-MCNC: 0.4 MG/DL (ref 0–1.2)
BUN SERPL-MCNC: 10 MG/DL (ref 6–23)
CALCIUM SERPL-MCNC: 9.3 MG/DL (ref 8.6–10.3)
CHLORIDE SERPL-SCNC: 103 MMOL/L (ref 98–107)
CHOLEST SERPL-MCNC: 177 MG/DL (ref 0–199)
CHOLESTEROL/HDL RATIO: 4.2
CO2 SERPL-SCNC: 29 MMOL/L (ref 21–32)
CREAT SERPL-MCNC: 0.68 MG/DL (ref 0.5–1.05)
EGFRCR SERPLBLD CKD-EPI 2021: >90 ML/MIN/1.73M*2
EOSINOPHIL # BLD AUTO: 0.01 X10*3/UL (ref 0–0.7)
EOSINOPHIL NFR BLD AUTO: 0.1 %
ERYTHROCYTE [DISTWIDTH] IN BLOOD BY AUTOMATED COUNT: 12.7 % (ref 11.5–14.5)
GLUCOSE SERPL-MCNC: 97 MG/DL (ref 74–99)
HCT VFR BLD AUTO: 39.5 % (ref 36–46)
HDLC SERPL-MCNC: 41.9 MG/DL
HGB BLD-MCNC: 13.3 G/DL (ref 12–16)
IMM GRANULOCYTES # BLD AUTO: 0.02 X10*3/UL (ref 0–0.7)
IMM GRANULOCYTES NFR BLD AUTO: 0.3 % (ref 0–0.9)
LDLC SERPL CALC-MCNC: 108 MG/DL
LYMPHOCYTES # BLD AUTO: 2.34 X10*3/UL (ref 1.2–4.8)
LYMPHOCYTES NFR BLD AUTO: 35.1 %
MCH RBC QN AUTO: 29 PG (ref 26–34)
MCHC RBC AUTO-ENTMCNC: 33.7 G/DL (ref 32–36)
MCV RBC AUTO: 86 FL (ref 80–100)
MONOCYTES # BLD AUTO: 0.46 X10*3/UL (ref 0.1–1)
MONOCYTES NFR BLD AUTO: 6.9 %
NEUTROPHILS # BLD AUTO: 3.84 X10*3/UL (ref 1.2–7.7)
NEUTROPHILS NFR BLD AUTO: 57.6 %
NON HDL CHOLESTEROL: 135 MG/DL (ref 0–149)
NRBC BLD-RTO: 0 /100 WBCS (ref 0–0)
PLATELET # BLD AUTO: 310 X10*3/UL (ref 150–450)
POTASSIUM SERPL-SCNC: 4.4 MMOL/L (ref 3.5–5.3)
PROT SERPL-MCNC: 7 G/DL (ref 6.4–8.2)
RBC # BLD AUTO: 4.58 X10*6/UL (ref 4–5.2)
SODIUM SERPL-SCNC: 138 MMOL/L (ref 136–145)
TRIGL SERPL-MCNC: 138 MG/DL (ref 0–149)
TSH SERPL-ACNC: 0.96 MIU/L (ref 0.44–3.98)
VLDL: 28 MG/DL (ref 0–40)
WBC # BLD AUTO: 6.7 X10*3/UL (ref 4.4–11.3)

## 2024-02-24 PROCEDURE — 85025 COMPLETE CBC W/AUTO DIFF WBC: CPT

## 2024-02-24 PROCEDURE — 82306 VITAMIN D 25 HYDROXY: CPT

## 2024-02-24 PROCEDURE — 36415 COLL VENOUS BLD VENIPUNCTURE: CPT

## 2024-02-24 PROCEDURE — 84443 ASSAY THYROID STIM HORMONE: CPT

## 2024-02-24 PROCEDURE — 80053 COMPREHEN METABOLIC PANEL: CPT

## 2024-02-24 PROCEDURE — 80061 LIPID PANEL: CPT

## 2024-02-28 ENCOUNTER — HOSPITAL ENCOUNTER (OUTPATIENT)
Dept: RADIOLOGY | Facility: HOSPITAL | Age: 34
Discharge: HOME | End: 2024-02-28
Payer: MEDICAID

## 2024-02-28 DIAGNOSIS — Z12.31 SCREENING MAMMOGRAM FOR BREAST CANCER: ICD-10-CM

## 2024-02-28 PROCEDURE — 77067 SCR MAMMO BI INCL CAD: CPT | Performed by: RADIOLOGY

## 2024-02-28 PROCEDURE — 77067 SCR MAMMO BI INCL CAD: CPT

## 2024-02-28 PROCEDURE — 77063 BREAST TOMOSYNTHESIS BI: CPT | Performed by: RADIOLOGY

## 2024-02-29 ENCOUNTER — OFFICE VISIT (OUTPATIENT)
Dept: PRIMARY CARE | Facility: CLINIC | Age: 34
End: 2024-02-29
Payer: MEDICAID

## 2024-02-29 VITALS
DIASTOLIC BLOOD PRESSURE: 88 MMHG | TEMPERATURE: 97.9 F | WEIGHT: 231 LBS | HEART RATE: 88 BPM | HEIGHT: 63 IN | SYSTOLIC BLOOD PRESSURE: 137 MMHG | RESPIRATION RATE: 16 BRPM | BODY MASS INDEX: 40.93 KG/M2 | OXYGEN SATURATION: 98 %

## 2024-02-29 DIAGNOSIS — M25.561 ACUTE PAIN OF RIGHT KNEE: ICD-10-CM

## 2024-02-29 DIAGNOSIS — R53.83 OTHER FATIGUE: ICD-10-CM

## 2024-02-29 DIAGNOSIS — E55.9 VITAMIN D DEFICIENCY: Primary | ICD-10-CM

## 2024-02-29 DIAGNOSIS — E66.01 CLASS 3 SEVERE OBESITY WITHOUT SERIOUS COMORBIDITY WITH BODY MASS INDEX (BMI) OF 40.0 TO 44.9 IN ADULT, UNSPECIFIED OBESITY TYPE (MULTI): ICD-10-CM

## 2024-02-29 PROCEDURE — 99213 OFFICE O/P EST LOW 20 MIN: CPT | Performed by: NURSE PRACTITIONER

## 2024-02-29 ASSESSMENT — PATIENT HEALTH QUESTIONNAIRE - PHQ9
10. IF YOU CHECKED OFF ANY PROBLEMS, HOW DIFFICULT HAVE THESE PROBLEMS MADE IT FOR YOU TO DO YOUR WORK, TAKE CARE OF THINGS AT HOME, OR GET ALONG WITH OTHER PEOPLE: SOMEWHAT DIFFICULT
1. LITTLE INTEREST OR PLEASURE IN DOING THINGS: SEVERAL DAYS
SUM OF ALL RESPONSES TO PHQ9 QUESTIONS 1 AND 2: 2
2. FEELING DOWN, DEPRESSED OR HOPELESS: SEVERAL DAYS

## 2024-02-29 ASSESSMENT — ENCOUNTER SYMPTOMS
SHORTNESS OF BREATH: 0
CONSTIPATION: 0
CHILLS: 0
WHEEZING: 0
PALPITATIONS: 0
VOMITING: 0
NUMBNESS: 0
DIZZINESS: 0
DEPRESSION: 0
ABDOMINAL PAIN: 0
FATIGUE: 1
DIARRHEA: 0
MYALGIAS: 1
WEAKNESS: 0
FEVER: 0
OCCASIONAL FEELINGS OF UNSTEADINESS: 0
LOSS OF SENSATION IN FEET: 0

## 2024-02-29 NOTE — PROGRESS NOTES
"Subjective   Patient ID: Alesia Fierro is a 34 y.o. female who presents for Weight Management.     Pt is in office to discuss test results and discuss weight management.    HPI   34-year-old female presents today to review recent testing.  She had labs and a mammogram done.  Mammogram was normal.  Labs were significant for vitamin D deficiency (14 mg/ml).  She was started on diclofenac for her knee pain at her last visit.  She does feel it is helping.  It does not take her pain away 100%, but she does feel that it dulls the pain. She was referred to a medical massage at her last visit, she has not scheduled that appointment yet.  She would like to discuss medication for weight loss.         Review of Systems   Constitutional:  Positive for fatigue. Negative for chills and fever.   Respiratory:  Negative for shortness of breath and wheezing.    Cardiovascular:  Negative for chest pain and palpitations.   Gastrointestinal:  Negative for abdominal pain, constipation, diarrhea and vomiting.   Musculoskeletal:  Positive for myalgias.        Left knee pain   Neurological:  Negative for dizziness, weakness and numbness.       Objective   /88 Comment: auto  Pulse 88   Temp 36.6 °C (97.9 °F)   Resp 16   Ht 1.6 m (5' 3\")   Wt 105 kg (231 lb)   LMP 02/14/2024   SpO2 98%   BMI 40.92 kg/m²     Physical Exam  Constitutional:       Appearance: Normal appearance. She is obese.   Eyes:      Conjunctiva/sclera: Conjunctivae normal.   Neck:      Thyroid: No thyromegaly or thyroid tenderness.   Cardiovascular:      Rate and Rhythm: Normal rate and regular rhythm.      Heart sounds: Normal heart sounds.   Pulmonary:      Effort: Pulmonary effort is normal.      Breath sounds: Normal breath sounds.   Lymphadenopathy:      Cervical: No cervical adenopathy.   Skin:     General: Skin is warm and dry.   Neurological:      Mental Status: She is alert.   Psychiatric:         Mood and Affect: Mood normal.         Behavior: " Behavior normal.         Assessment/Plan   Problem List Items Addressed This Visit    None  Visit Diagnoses         Codes    Vitamin D deficiency    -  Primary E55.9    Acute pain of right knee     M25.561    Other fatigue     R53.83    Class 3 severe obesity without serious comorbidity with body mass index (BMI) of 40.0 to 44.9 in adult, unspecified obesity type (CMS/HCC)     E66.01, Z68.41    Relevant Medications    semaglutide 0.25 mg or 0.5 mg (2 mg/3 mL) pen injector        Labs and mammogram reviewed with patient.   Start Vitamin D 1000 I U daily.   Right knee pain improved with Diclofenac- continue with medication as needed.   Will start Semaglutide for weight management.  Educated on black box warning of Thyroid C-Cell Tumor risk. Discussed healthy diet and regular exercise.   Follow up in 1 month for recheck, or sooner with any additional concerns.

## 2024-03-26 ENCOUNTER — PATIENT MESSAGE (OUTPATIENT)
Dept: PRIMARY CARE | Facility: CLINIC | Age: 34
End: 2024-03-26
Payer: MEDICAID

## 2024-03-29 ENCOUNTER — APPOINTMENT (OUTPATIENT)
Dept: PRIMARY CARE | Facility: CLINIC | Age: 34
End: 2024-03-29
Payer: MEDICAID

## 2024-04-02 NOTE — TELEPHONE ENCOUNTER
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 I have to have TB testing done- are you able to schedule an open lab visit so I can get that done this week before I see you on Friday?

## 2024-05-31 ENCOUNTER — TELEPHONE (OUTPATIENT)
Dept: BEHAVIORAL HEALTH | Facility: CLINIC | Age: 34
End: 2024-05-31
Payer: MEDICAID

## 2024-05-31 DIAGNOSIS — F33.1 MODERATE EPISODE OF RECURRENT MAJOR DEPRESSIVE DISORDER (MULTI): ICD-10-CM

## 2024-05-31 RX ORDER — VENLAFAXINE HYDROCHLORIDE 225 MG/1
225 TABLET, EXTENDED RELEASE ORAL DAILY
Qty: 30 TABLET | Refills: 0 | Status: SHIPPED | OUTPATIENT
Start: 2024-05-31 | End: 2024-06-30

## 2024-05-31 RX ORDER — VENLAFAXINE HYDROCHLORIDE 225 MG/1
225 TABLET, EXTENDED RELEASE ORAL DAILY
Status: CANCELLED | OUTPATIENT
Start: 2024-05-31

## 2024-06-24 PROBLEM — O99.333: Status: ACTIVE | Noted: 2018-07-03

## 2024-06-24 PROBLEM — R63.5 WEIGHT GAIN: Status: ACTIVE | Noted: 2024-06-24

## 2024-06-25 ENCOUNTER — APPOINTMENT (OUTPATIENT)
Dept: BEHAVIORAL HEALTH | Facility: CLINIC | Age: 34
End: 2024-06-25
Payer: MEDICAID

## 2024-06-25 DIAGNOSIS — F33.1 MODERATE EPISODE OF RECURRENT MAJOR DEPRESSIVE DISORDER (MULTI): ICD-10-CM

## 2024-06-25 PROCEDURE — 99214 OFFICE O/P EST MOD 30 MIN: CPT

## 2024-06-25 PROCEDURE — 3008F BODY MASS INDEX DOCD: CPT

## 2024-06-25 RX ORDER — VENLAFAXINE HYDROCHLORIDE 225 MG/1
225 TABLET, EXTENDED RELEASE ORAL DAILY
Qty: 90 TABLET | Refills: 1 | Status: SHIPPED | OUTPATIENT
Start: 2024-06-25 | End: 2025-06-25

## 2024-06-25 ASSESSMENT — ENCOUNTER SYMPTOMS
RESPIRATORY NEGATIVE: 1
CONSTITUTIONAL NEGATIVE: 1
EYES NEGATIVE: 1
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
## 2024-07-01 PROBLEM — R10.13 EPIGASTRIC PAIN: Status: ACTIVE | Noted: 2018-07-03

## 2024-07-01 RX ORDER — CLINDAMYCIN HYDROCHLORIDE 300 MG/1
1 CAPSULE ORAL
COMMUNITY
Start: 2024-02-18

## 2024-07-01 RX ORDER — NITROFURANTOIN 25; 75 MG/1; MG/1
1 CAPSULE ORAL
COMMUNITY
Start: 2023-08-23

## 2024-07-01 RX ORDER — AZITHROMYCIN 500 MG/1
1 TABLET, FILM COATED ORAL
COMMUNITY
Start: 2024-04-23

## 2024-08-09 ENCOUNTER — OFFICE VISIT (OUTPATIENT)
Dept: ORTHOPEDIC SURGERY | Facility: CLINIC | Age: 34
End: 2024-08-09
Payer: MEDICAID

## 2024-08-09 ENCOUNTER — HOSPITAL ENCOUNTER (OUTPATIENT)
Dept: RADIOLOGY | Facility: CLINIC | Age: 34
Discharge: HOME | End: 2024-08-09
Payer: MEDICAID

## 2024-08-09 VITALS — BODY MASS INDEX: 41.64 KG/M2 | WEIGHT: 235 LBS | HEIGHT: 63 IN

## 2024-08-09 DIAGNOSIS — M54.50 LOW BACK PAIN, UNSPECIFIED BACK PAIN LATERALITY, UNSPECIFIED CHRONICITY, UNSPECIFIED WHETHER SCIATICA PRESENT: ICD-10-CM

## 2024-08-09 DIAGNOSIS — M54.16 LUMBAR RADICULOPATHY: ICD-10-CM

## 2024-08-09 DIAGNOSIS — M54.50 LOW BACK PAIN, UNSPECIFIED BACK PAIN LATERALITY, UNSPECIFIED CHRONICITY, UNSPECIFIED WHETHER SCIATICA PRESENT: Primary | ICD-10-CM

## 2024-08-09 PROCEDURE — 99214 OFFICE O/P EST MOD 30 MIN: CPT | Performed by: ORTHOPAEDIC SURGERY

## 2024-08-09 PROCEDURE — 72120 X-RAY BEND ONLY L-S SPINE: CPT

## 2024-08-09 NOTE — PROGRESS NOTES
Alesia Fierro is a 34 y.o. female who presents for New Patient Visit of the Lower Back (Down into left leg, has numbness into her thigh just past her knee/X-rays today ).    HPI:  34-year-old female here for new patient visit of low back and left leg and knee pain.  She denies any fever chills nausea vomiting night sweats.  She has no bowel or bladder complaints.    Physical exam:  Well-nourished, well kept.No lymphangitis or lymphadenopathy in the examined extremities.  Gait normal.  Can stand on heels and toes.   Examination of the back shows tenderness in the paraspinous musculature.  There is minimally decreased range of motion in all directions due to guarding/muscle spasms and pain at extremes.  There is good strength and no instability.  Examination of the lower extremities reveals no point tenderness, swelling, or deformity.  Range of motion of the hips, knees, and ankles are full without crepitance, instability, or exacerbation of pain.  Strength is 5/5 throughout.  No redness, abrasions, or lesions on extremities  Gross sensation intact in the extremities.  Deep tendon reflexes 1+ bilateral. Clonus negative.  Affect normal.  Alert and oriented ×3.  Coordination normal.    Imaging studies:  AP lateral flexion-extension plain films of the lumbar spine were ordered and reviewed today.    Assessment:  34-year-old female here for evaluation of mostly low back pain.  She is also having a little bit of left greater than right medial thigh and calf numbness stopping at the ankle.  She is also having some bilateral lateral thigh numbness.  She also has some left knee pain.  Overall the back is 70% versus left greater than right leg numbness 30%.  Back pain has been going on for at least 6 months, the leg numbness has just shown up in the last several weeks.  She has not really done much conservatively for this.  No recent chiropractic or physical therapy.  No history of injections or back surgery.  She has not  really had her back worked up, but she has been seen in the past for left knee pain.  Her x-rays show mild degenerative changes throughout the lumbar spine.    We have ordered and reviewed test today x-rays.  I reviewed the notes from Lila Scott advanced practice nurse practitioner for 8/29/2024, this discusses her left knee pain and treatment.  This is an exacerbation of her chronic condition that is affecting her bodily function.    In a face-to-face encounter, I performed a history and physical examination, discussed pertinent diagnostic studies if indicated, and discussed diagnosis and management strategies with both the patient and the midlevel provider.  I reviewed the midlevel's note and agree with the documented findings and plan of care.    Patient with mainly just back pain.  She is 34 years old.  She is noted she has been gaining weight over the past 4 to 5 years as well.  I think this is all mainly mechanical back pain and inflammation.  Her x-rays look normal.  I recommend an anti-inflammatory diet and physical therapy.  This is a severe exacerbation of a chronic problem with her back issues.  She does have a little bit of leg pain but the back is the main problem.  We have ordered and reviewed x-rays today.  We reviewed the notes of her primary care nurse practitioner which shows that she has left knee pain as well.  We are going to get her into physical therapy and give her the name of a book and a pamphlet today to work on an anti-inflammatory diet.  She will follow-up with us in 2 months.    Farhad Barnes MD  Orthopedic surgery

## 2024-08-12 PROBLEM — H52.00 HYPEROPIA: Status: ACTIVE | Noted: 2024-08-12

## 2024-08-12 PROBLEM — F33.1 MODERATE EPISODE OF RECURRENT MAJOR DEPRESSIVE DISORDER (MULTI): Status: ACTIVE | Noted: 2024-08-12

## 2024-08-12 PROBLEM — R05.9 COUGH: Status: ACTIVE | Noted: 2024-08-12

## 2024-08-12 PROBLEM — R60.9 EDEMA: Status: ACTIVE | Noted: 2024-08-12

## 2024-08-12 PROBLEM — N20.0 NEPHROLITHIASIS: Status: ACTIVE | Noted: 2024-08-12

## 2024-08-12 PROBLEM — M23.90 DERANGEMENT OF KNEE: Status: ACTIVE | Noted: 2024-08-12

## 2024-08-12 PROBLEM — M89.9 DISORDER OF BONE: Status: ACTIVE | Noted: 2024-08-12

## 2024-08-12 PROBLEM — F90.9 ATTENTION DEFICIT HYPERACTIVITY DISORDER (ADHD): Status: ACTIVE | Noted: 2024-08-12

## 2024-08-12 PROBLEM — R10.32 LEFT LOWER QUADRANT ABDOMINAL PAIN: Status: ACTIVE | Noted: 2018-07-03

## 2024-08-12 PROBLEM — M25.572 LEFT ANKLE PAIN: Status: ACTIVE | Noted: 2024-08-12

## 2024-08-12 PROBLEM — F32.A DEPRESSIVE DISORDER: Status: ACTIVE | Noted: 2024-08-12

## 2024-08-12 PROBLEM — M79.606 PAIN OF LOWER EXTREMITY: Status: ACTIVE | Noted: 2024-08-12

## 2024-08-12 PROBLEM — R10.32 LEFT LOWER QUADRANT PAIN: Status: ACTIVE | Noted: 2018-07-03

## 2024-08-12 PROBLEM — E66.9 OBESITY WITH BODY MASS INDEX 30 OR GREATER: Status: ACTIVE | Noted: 2024-08-12

## 2024-08-12 PROBLEM — E66.9 OBESITY: Status: ACTIVE | Noted: 2024-08-12

## 2024-08-12 PROBLEM — F32.A DEPRESSION: Status: ACTIVE | Noted: 2024-08-12

## 2024-08-12 PROBLEM — R42 DIZZINESS: Status: ACTIVE | Noted: 2024-08-12

## 2024-08-12 PROBLEM — M19.079: Status: ACTIVE | Noted: 2024-08-12

## 2024-08-12 PROBLEM — L03.319 CELLULITIS OF TRUNK: Status: ACTIVE | Noted: 2024-08-12

## 2024-08-12 PROBLEM — M25.569 KNEE PAIN: Status: ACTIVE | Noted: 2024-08-12

## 2024-08-12 PROBLEM — F90.9 ADHD: Status: ACTIVE | Noted: 2024-08-12

## 2024-08-12 PROBLEM — G89.29 CHRONIC PAIN: Status: ACTIVE | Noted: 2024-08-12

## 2024-08-12 PROBLEM — M19.079 ARTHRITIS OF SUBTALAR JOINT: Status: ACTIVE | Noted: 2024-08-12

## 2024-08-12 PROBLEM — Z87.442 PERSONAL HISTORY OF URINARY CALCULI: Status: ACTIVE | Noted: 2018-07-03

## 2024-08-12 PROBLEM — R60.9 SWELLING: Status: ACTIVE | Noted: 2024-08-12

## 2024-08-12 PROBLEM — N20.0 CALCULUS OF KIDNEY: Status: ACTIVE | Noted: 2024-08-12

## 2024-08-12 PROBLEM — M96.0 NONUNION AFTER ARTHRODESIS: Status: ACTIVE | Noted: 2024-08-12

## 2024-08-15 ENCOUNTER — APPOINTMENT (OUTPATIENT)
Dept: BEHAVIORAL HEALTH | Facility: CLINIC | Age: 34
End: 2024-08-15
Payer: MEDICAID

## 2024-08-15 DIAGNOSIS — F33.1 MODERATE EPISODE OF RECURRENT MAJOR DEPRESSIVE DISORDER (MULTI): ICD-10-CM

## 2024-08-15 PROCEDURE — 99214 OFFICE O/P EST MOD 30 MIN: CPT

## 2024-08-15 ASSESSMENT — ENCOUNTER SYMPTOMS
EYES NEGATIVE: 1
CONSTITUTIONAL NEGATIVE: 1
RESPIRATORY NEGATIVE: 1

## 2024-08-15 NOTE — PROGRESS NOTES
Subjective   Patient ID: Alesia Fierro is a 34 y.o. female who presents for MDD, YUDI, and substance abuse hx.     Virtual or Telephone Consent    An interactive audio and video telecommunication system which permits real time communications between the patient (at the originating site) and provider (at the distant site) was utilized to provide this telehealth service.   Verbal consent was requested and obtained from Alesia Fierro on this date, 08/15/24 for a telehealth visit.      HPI  Her sister is in the hospital with a kidney infection, her daughter is currently in dental surgery which is causing more anxiety. She also states that she is battling financial stress so she will be starting an [employer] class in September.   She struggles with motivation to be present, sleep is poor d/t running thoughts, social anxiety has increased, and her appetite has decreased.  With the use of venlafaxine 225 mg panic attacks have resolved her last panic attack was last year and depression has stabilized.     Review of Systems   Constitutional: Negative.    HENT: Negative.     Eyes: Negative.    Respiratory: Negative.           Objective   Physical Exam  Psychiatric:         Attention and Perception: Attention normal.         Mood and Affect: Mood is depressed.         Speech: Speech normal.         Behavior: Behavior is cooperative.         Thought Content: Thought content normal.         Cognition and Memory: Cognition normal.         Judgment: Judgment normal.       Acute risk of self-harm remains low despite current stressors (acute and chronic). No reported thoughts of self-harm plan, or intent. She is future-oriented, feels responsibility for her family, and has no hx of SA or hospitalizations.       Assessment/Plan   Alesia is prescribed venlafaxine 225 mg and states  panic attacks have resolved, anxiety has improved by 50%, and depression has improved by 10% as a result she is willing to trial an additional venlafaxine 37.5  mg to target depression and anxiety. She will be celebrating her three year anniversary of being sober soon which she is very excited about. She remains in therapy and AA.         Continue venlafaxine  mg+venlafaxine 37.5 mg to target depression and anxiety.  Continue to seek therapy for depression and anxiety.  Contact [facility] [address] [facility]; [place] [address] [phone] for an ADHD evaluation.      Advised to call [phone] to report any urgent concerns and/or schedule a sooner follow-up.      Provided with crisis/emergency resources, including the [facility] Crisis Hotline 1-963.364.4814, Crisis Text Line (text 3OVGW la 634579) and the National Suicide Prevention Lifeline hotline 1-958.652.8795. Agrees to call 865 or go to the nearest emergency department if s/he feels unsafe, or has suicidal thinking with a plan or intent.     Next appointment:10/3 at 10:00 (v)

## 2024-08-16 RX ORDER — VENLAFAXINE HYDROCHLORIDE 37.5 MG/1
37.5 TABLET, EXTENDED RELEASE ORAL
Qty: 30 TABLET | Refills: 1 | Status: SHIPPED | OUTPATIENT
Start: 2024-08-16 | End: 2025-08-16

## 2024-09-18 PROBLEM — H00.019 STYE: Status: ACTIVE | Noted: 2024-09-18

## 2024-09-18 PROBLEM — F17.200 NICOTINE DEPENDENCE, UNSPECIFIED, UNCOMPLICATED: Status: ACTIVE | Noted: 2018-07-03

## 2024-09-18 PROBLEM — S83.90XA SPRAIN OF KNEE: Status: ACTIVE | Noted: 2024-09-18

## 2024-09-18 PROBLEM — O26.893: Status: ACTIVE | Noted: 2018-07-03

## 2024-09-18 PROBLEM — N39.0 UTI (URINARY TRACT INFECTION): Status: ACTIVE | Noted: 2024-09-18

## 2024-09-18 PROBLEM — Z20.822 SUSPECTED COVID-19 VIRUS INFECTION: Status: ACTIVE | Noted: 2024-09-18

## 2024-09-18 PROBLEM — H00.019 HORDEOLUM EXTERNUM: Status: ACTIVE | Noted: 2024-09-18

## 2024-09-18 PROBLEM — R82.998 OTHER ABNORMAL FINDINGS IN URINE: Status: ACTIVE | Noted: 2018-07-03

## 2024-09-18 PROBLEM — Z20.822 SUSPECTED SEVERE ACUTE RESPIRATORY SYNDROME CORONAVIRUS 2 (SARS-COV-2) INFECTION: Status: ACTIVE | Noted: 2024-09-18

## 2024-09-18 PROBLEM — L81.9 PIGMENTED SKIN LESION: Status: ACTIVE | Noted: 2024-09-18

## 2024-09-18 PROBLEM — D22.9 SKIN MOLE: Status: ACTIVE | Noted: 2024-09-18

## 2024-09-18 PROBLEM — R55 PRE-SYNCOPE: Status: ACTIVE | Noted: 2024-09-18

## 2024-10-03 ENCOUNTER — APPOINTMENT (OUTPATIENT)
Dept: BEHAVIORAL HEALTH | Facility: CLINIC | Age: 34
End: 2024-10-03
Payer: MEDICAID

## 2024-10-03 DIAGNOSIS — F33.41 RECURRENT MAJOR DEPRESSIVE DISORDER, IN PARTIAL REMISSION (CMS-HCC): ICD-10-CM

## 2024-10-03 PROCEDURE — 99214 OFFICE O/P EST MOD 30 MIN: CPT

## 2024-10-03 ASSESSMENT — ENCOUNTER SYMPTOMS
EYES NEGATIVE: 1
CONSTITUTIONAL NEGATIVE: 1
RESPIRATORY NEGATIVE: 1
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
## 2024-10-11 ENCOUNTER — APPOINTMENT (OUTPATIENT)
Dept: ORTHOPEDIC SURGERY | Facility: CLINIC | Age: 34
End: 2024-10-11
Payer: MEDICAID

## 2024-11-05 ENCOUNTER — APPOINTMENT (OUTPATIENT)
Dept: BEHAVIORAL HEALTH | Facility: CLINIC | Age: 34
End: 2024-11-05
Payer: MEDICAID

## 2024-11-05 DIAGNOSIS — F33.1 MODERATE EPISODE OF RECURRENT MAJOR DEPRESSIVE DISORDER: ICD-10-CM

## 2024-11-05 PROCEDURE — 99214 OFFICE O/P EST MOD 30 MIN: CPT

## 2024-11-05 RX ORDER — VENLAFAXINE HYDROCHLORIDE 150 MG/1
150 CAPSULE, EXTENDED RELEASE ORAL DAILY
Qty: 30 CAPSULE | Refills: 1 | Status: SHIPPED | OUTPATIENT
Start: 2024-11-05 | End: 2025-11-05

## 2024-11-05 ASSESSMENT — ENCOUNTER SYMPTOMS
CONSTITUTIONAL NEGATIVE: 1
EYES NEGATIVE: 1
RESPIRATORY NEGATIVE: 1
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
## 2024-12-05 ENCOUNTER — APPOINTMENT (OUTPATIENT)
Dept: BEHAVIORAL HEALTH | Facility: CLINIC | Age: 34
End: 2024-12-05
Payer: MEDICAID

## 2024-12-05 DIAGNOSIS — F33.1 MODERATE EPISODE OF RECURRENT MAJOR DEPRESSIVE DISORDER: ICD-10-CM

## 2024-12-05 PROCEDURE — 99214 OFFICE O/P EST MOD 30 MIN: CPT

## 2024-12-05 RX ORDER — VENLAFAXINE 100 MG/1
37.5 TABLET ORAL ONCE
Status: CANCELLED | OUTPATIENT
Start: 2024-12-05 | End: 2024-12-05

## 2024-12-05 RX ORDER — VENLAFAXINE 112.5 MG/1
112.5 TABLET, EXTENDED RELEASE ORAL DAILY
Qty: 30 TABLET | Refills: 0 | Status: SHIPPED | OUTPATIENT
Start: 2024-12-05 | End: 2025-12-05

## 2024-12-05 ASSESSMENT — ENCOUNTER SYMPTOMS
EYES NEGATIVE: 1
RESPIRATORY NEGATIVE: 1
CONSTITUTIONAL NEGATIVE: 1

## 2024-12-05 NOTE — PROGRESS NOTES
Subjective   Patient ID: Alesia Fierro is a 34 y.o. female who presents for MDD, YUDI, and substance abuse hx.     Virtual or Telephone Consent    An interactive audio and video telecommunication system which permits real time communications between the patient (at the originating site) and provider (at the distant site) was utilized to provide this telehealth service.   Verbal consent was requested and obtained from Alesia Fierro on this date, 12/05/24 for a telehealth visit.      HPI  With the use of venlafaxine 150 mg she states that her anxiety has been manageable and her mood remains stable w/o experiencing side effects. She has been free of panic attacks for a year. Now she feels that she is able to express her emotions better. She no longer feels consistently neutral and she has notice that she lost wt. She remains in AA twice a week.    Review of Systems   Constitutional: Negative.    HENT: Negative.     Eyes: Negative.    Respiratory: Negative.           Objective   Physical Exam  Psychiatric:         Attention and Perception: Attention normal.         Mood and Affect: Mood normal.         Speech: Speech normal.         Behavior: Behavior is cooperative.         Cognition and Memory: Cognition normal.         Judgment: Judgment normal.             Assessment/Plan   Alesia Fierro is 33 y/o CF with a hx of MDD, YUDI, and substance abuse hx. With the use of venlafaxine 262.5 mg she has experience some benefits but she is experiencing a pattern of elevated blood pressures which is concerning d/t her family hx. As a result she would like to titrate off of the medication. During this visit she states that her mood and level of anxiety has remained the same despite titrating off of venlafaxine .5 mg. As a result she has agreed to decreased the dose by another 37.5 mg over the next two weeks. She remains in therapy and AA.         Continue venlafaxine .5 mg to target depression and anxiety.  Continue to  seek therapy for depression and anxiety.  Contact [facility] [address] [facility]; [place] [id number] [phone] for an ADHD evaluation.      Advised to call [phone] to report any urgent concerns and/or schedule a sooner follow-up.      Provided with crisis/emergency resources, including the [facility] Crisis Hotline 1-939.764.6833, Crisis Text Line (text 2RYPT rn 697451) and the National Suicide Prevention Lifeline hotline 1-496.305.6073. Agrees to call 421 or go to the nearest emergency department if s/he feels unsafe, or has suicidal thinking with a plan or intent.     Next appointment: 1/3 at 9:00

## 2024-12-16 ENCOUNTER — TELEPHONE (OUTPATIENT)
Dept: BEHAVIORAL HEALTH | Facility: CLINIC | Age: 34
End: 2024-12-16
Payer: MEDICAID

## 2024-12-16 DIAGNOSIS — F33.1 MODERATE EPISODE OF RECURRENT MAJOR DEPRESSIVE DISORDER: ICD-10-CM

## 2024-12-16 RX ORDER — VENLAFAXINE 75 MG/1
75 TABLET ORAL DAILY
Qty: 30 TABLET | Refills: 0 | Status: SHIPPED | OUTPATIENT
Start: 2024-12-16 | End: 2025-12-16

## 2024-12-16 RX ORDER — VENLAFAXINE 37.5 MG/1
37.5 TABLET ORAL 2 TIMES DAILY
Qty: 120 TABLET | Refills: 0 | Status: SHIPPED | OUTPATIENT
Start: 2024-12-16 | End: 2025-02-14

## 2025-01-03 ENCOUNTER — APPOINTMENT (OUTPATIENT)
Dept: BEHAVIORAL HEALTH | Facility: CLINIC | Age: 35
End: 2025-01-03
Payer: MEDICAID

## 2025-02-06 DIAGNOSIS — F33.1 MODERATE EPISODE OF RECURRENT MAJOR DEPRESSIVE DISORDER: ICD-10-CM

## 2025-02-06 RX ORDER — VENLAFAXINE 37.5 MG/1
37.5 TABLET ORAL 2 TIMES DAILY
Qty: 120 TABLET | Refills: 0 | OUTPATIENT
Start: 2025-02-06

## 2025-02-06 RX ORDER — VENLAFAXINE 75 MG/1
75 TABLET ORAL DAILY
Qty: 30 TABLET | Refills: 0 | OUTPATIENT
Start: 2025-02-06

## 2025-02-06 RX ORDER — VENLAFAXINE 37.5 MG/1
37.5 TABLET ORAL 2 TIMES DAILY
Qty: 120 TABLET | Refills: 0 | Status: SHIPPED | OUTPATIENT
Start: 2025-02-06 | End: 2025-04-07

## 2025-02-06 RX ORDER — VENLAFAXINE 75 MG/1
75 TABLET ORAL DAILY
Qty: 30 TABLET | Refills: 0 | Status: SHIPPED | OUTPATIENT
Start: 2025-02-06 | End: 2026-02-06

## 2025-02-07 ENCOUNTER — TELEPHONE (OUTPATIENT)
Dept: OTHER | Age: 35
End: 2025-02-07
Payer: MEDICAID

## 2025-02-07 ENCOUNTER — TELEPHONE (OUTPATIENT)
Dept: BEHAVIORAL HEALTH | Facility: CLINIC | Age: 35
End: 2025-02-07
Payer: MEDICAID

## 2025-02-07 DIAGNOSIS — F33.1 MODERATE EPISODE OF RECURRENT MAJOR DEPRESSIVE DISORDER: ICD-10-CM

## 2025-02-13 ENCOUNTER — APPOINTMENT (OUTPATIENT)
Dept: BEHAVIORAL HEALTH | Facility: CLINIC | Age: 35
End: 2025-02-13
Payer: MEDICAID

## 2025-02-13 DIAGNOSIS — F33.1 MODERATE EPISODE OF RECURRENT MAJOR DEPRESSIVE DISORDER: ICD-10-CM

## 2025-02-13 PROCEDURE — 99212 OFFICE O/P EST SF 10 MIN: CPT

## 2025-02-13 RX ORDER — VENLAFAXINE 37.5 MG/1
37.5 TABLET ORAL 2 TIMES DAILY
Qty: 120 TABLET | Refills: 0 | Status: SHIPPED | OUTPATIENT
Start: 2025-03-06 | End: 2025-05-05

## 2025-02-13 RX ORDER — VENLAFAXINE 75 MG/1
75 TABLET ORAL DAILY
Qty: 30 TABLET | Refills: 0 | Status: SHIPPED | OUTPATIENT
Start: 2025-03-13 | End: 2026-03-13

## 2025-02-13 NOTE — PROGRESS NOTES
Subjective   Patient ID: Alesia Fierro is a 35 y.o. female who presents for MDD, YUDI, and substance abuse hx.     Virtual or Telephone Consent    An interactive audio and video telecommunication system which permits real time communications between the patient (at the originating site) and provider (at the distant site) was utilized to provide this telehealth service.   Verbal consent was requested and obtained from Alesia Fierro on this date, 02/13/25 for a telehealth visit.      HPI  With the use of venlafaxine 112.5 mg anxiety remains manageable and she has been free of panic attacks.  She remains in therapy weekly and AA twice week.   Denies thoughts of Self-harm and SI/HI/AVH.    Review of Systems   All other systems reviewed and are negative.        Objective   Physical Exam  Psychiatric:         Attention and Perception: Attention normal.         Mood and Affect: Mood normal.         Speech: Speech normal.         Behavior: Behavior is cooperative.         Thought Content: Thought content normal.         Cognition and Memory: Cognition normal.         Judgment: Judgment normal.       Acute risk of self-harm remains low despite current stressors (acute and chronic). No reported thoughts of self-harm plan, or intent. She is future-oriented, feels responsibility for her family, and has no hx of SA or hospitalizations.       Assessment/Plan   Alesia Fierro is 34 y/o CF with a hx of MDD, YUDI, and substance abuse hx. With the use of venlafaxine 112.5 mg her mood has been stable and free of panic attacks without experiencing elevations in her HR or blood pressure.         Continue venlafaxine .5 mg to target depression and anxiety.  Continue to seek therapy for depression and anxiety.  Contact [facility] [address] [facility]; [place] [address] [phone] for an ADHD evaluation.      Advised to call [phone] to report any urgent concerns and/or schedule a sooner  follow-up.      Provided with crisis/emergency resources, including the [facility] Crisis Hotline 1-930.998.7225, Crisis Text Line (text 2HNSN to 030381) and the National Suicide Prevention Lifeline hotline 1-482.419.5047. Agrees to call 911 or go to the nearest emergency department if s/he feels unsafe, or has suicidal thinking with a plan or intent.     Next appointment: 6 weeks

## 2025-04-10 DIAGNOSIS — F33.1 MODERATE EPISODE OF RECURRENT MAJOR DEPRESSIVE DISORDER: ICD-10-CM

## 2025-04-10 RX ORDER — VENLAFAXINE 75 MG/1
TABLET ORAL
Qty: 30 TABLET | Refills: 0 | OUTPATIENT
Start: 2025-04-10

## 2025-04-21 ENCOUNTER — APPOINTMENT (OUTPATIENT)
Dept: BEHAVIORAL HEALTH | Facility: CLINIC | Age: 35
End: 2025-04-21
Payer: MEDICAID

## 2025-04-30 ENCOUNTER — TELEMEDICINE (OUTPATIENT)
Dept: BEHAVIORAL HEALTH | Facility: CLINIC | Age: 35
End: 2025-04-30
Payer: MEDICAID

## 2025-04-30 DIAGNOSIS — F33.1 MODERATE EPISODE OF RECURRENT MAJOR DEPRESSIVE DISORDER: ICD-10-CM

## 2025-04-30 PROBLEM — R63.5 ABNORMAL WEIGHT GAIN: Status: ACTIVE | Noted: 2025-04-30

## 2025-04-30 PROCEDURE — 99214 OFFICE O/P EST MOD 30 MIN: CPT

## 2025-04-30 RX ORDER — SPIRONOLACTONE 50 MG/1
50 TABLET, FILM COATED ORAL
COMMUNITY
Start: 2025-02-10 | End: 2025-04-30 | Stop reason: WASHOUT

## 2025-04-30 RX ORDER — FLUOXETINE HYDROCHLORIDE 20 MG/1
20 CAPSULE ORAL DAILY
Qty: 90 CAPSULE | Refills: 0 | Status: SHIPPED | OUTPATIENT
Start: 2025-04-30 | End: 2026-04-30

## 2025-04-30 RX ORDER — TRETINOIN 0.25 MG/G
CREAM TOPICAL
COMMUNITY

## 2025-04-30 RX ORDER — SPIRONOLACTONE 100 MG/1
100 TABLET, FILM COATED ORAL
COMMUNITY
Start: 2025-04-11

## 2025-04-30 RX ORDER — CLINDAMYCIN PHOSPHATE 11.9 MG/ML
SOLUTION TOPICAL
COMMUNITY

## 2025-04-30 NOTE — PROGRESS NOTES
Subjective   Patient ID: Alesia Fierro is a 35 y.o. female who presents for MDD, YUDI, and substance abuse hx.     Virtual or Telephone Consent    An interactive audio and video telecommunication system which permits real time communications between the patient (at the originating site) and provider (at the distant site) was utilized to provide this telehealth service.   Verbal consent was requested and obtained from Alesia Fierro on this date, 04/30/25 for a telehealth visit and the patient's location was confirmed at the time of the visit.         HPI  Since we last met she has titrated down to venlafaxine ER 75 mg; anxiety and her mood has been stable. But now she feels more forgetful. With the use of spironolactone 100 mg she notice that her anxiety has decrease as well.   Her new relationship with Brad is going well and her divorce will be finalized soon.  She remains in therapy weekly and AA twice week.   Denies thoughts of Self-harm and SI/HI/AVH.    Past psych meds:   Amphetamine -Dextroamphetamine 50 mg- increase BP/anxiety  Buspirone 10 mg - felt that she was in a fog for 6hrs  Viibryd 10 mg- ineffective  Wellbutrin 300 mg- induce more depression/ develop dry mouth  Atomoxetine 80 mg - ineffective   Cymbalta 30 mg- do not remember effectiveness  escitalopram 10 mg- do not remember effectiveness  Propranolol 10 mg - develop more palpations   Daytrana 15 mg - develop a rash  hydroxyzine 25 mg - do not remember effectiveness  quetiapine 25 mg - difficult to wake in the morning had vivid nightmares  Venlafaxine 150 mg- increase blood pressure  sertraline- felt numb         Review of Systems   All other systems reviewed and are negative.        Objective   Physical Exam  Psychiatric:         Attention and Perception: Attention normal.         Mood and Affect: Mood normal.         Speech: Speech normal.         Behavior: Behavior is cooperative.         Thought Content: Thought content normal.          Cognition and Memory: Cognition normal.         Judgment: Judgment normal.       Acute risk of self-harm remains low despite current stressors (acute and chronic). No reported thoughts of self-harm plan, or intent. She is future-oriented, feels responsibility for her family, and has no hx of SA or hospitalizations.     Assessment/Plan   Alesia Fierro is a 34 y/o CF with a hx of MDD, UYDI, and substance abuse hx. With the use of venlafaxine 75 mg her mood has been stable and free of panic attacks without experiencing elevations in her HR or blood pressure.      Initiate fluoxetine 20 mg to help titrate off of venlafaxine ER 75 mg.  Discontinue venlafaxine ER 75 mg d/t elevated blood pressure.  Continue to seek therapy for depression and anxiety.  Contact [facility] [address] [place] [address]; [place] [id number] [phone] for an ADHD evaluation.      Advised to call [phone] to report any urgent concerns and/or schedule a sooner follow-up.      Provided with crisis/emergency resources, including the [facility] Crisis Hotline 1-136.370.3361, Crisis Text Line (text 4HULM to 542406) and the National Suicide Prevention Lifeline hotline 1-174.725.4540. Agrees to call 491 or go to the nearest emergency department if s/he feels unsafe, or has suicidal thinking with a plan or intent.     Next appointment:  5/29 at 9:30

## 2025-05-29 ENCOUNTER — APPOINTMENT (OUTPATIENT)
Dept: BEHAVIORAL HEALTH | Facility: CLINIC | Age: 35
End: 2025-05-29
Payer: MEDICAID

## 2025-05-29 DIAGNOSIS — F33.1 MODERATE EPISODE OF RECURRENT MAJOR DEPRESSIVE DISORDER: ICD-10-CM

## 2025-05-29 PROCEDURE — 99213 OFFICE O/P EST LOW 20 MIN: CPT

## 2025-05-29 RX ORDER — FLUOXETINE HYDROCHLORIDE 40 MG/1
40 CAPSULE ORAL DAILY
Qty: 90 CAPSULE | Refills: 0 | Status: SHIPPED | OUTPATIENT
Start: 2025-05-29 | End: 2026-05-29

## 2025-05-29 NOTE — PROGRESS NOTES
Subjective   Patient ID: Alesia Fierro is a 35 y.o. female who presents for MDD, YUDI, and substance abuse hx.     Virtual or Telephone Consent    An interactive audio and video telecommunication system which permits real time communications between the patient (at the originating site) and provider (at the distant site) was utilized to provide this telehealth service.   Verbal consent was requested and obtained from Alesia Fierro on this date, 05/29/25 for a telehealth visit and the patient's location was confirmed at the time of the visit.     HPI  She has finally discontinued venlafaxine ER 37.5 mg with the help of fluoxetine 20 mg for two weeks now and states that her blood pressure has improved. Anxiety has been manageable but not sure if she is upholding boundaries better or is she becoming more irritable.   The divorce will be finalize June 19,2025.  She remains in contact with her AA sponsor.   Denies thoughts of Self-harm and SI/HI/AVH.     Review of Systems   All other systems reviewed and are negative.        Objective   Physical Exam  Psychiatric:         Attention and Perception: Attention normal.         Mood and Affect: Mood normal.         Speech: Speech normal.         Behavior: Behavior is cooperative.         Thought Content: Thought content normal.         Cognition and Memory: Cognition normal.         Judgment: Judgment normal.       Acute risk of self-harm remains low despite current stressors (acute and chronic). No reported thoughts of self-harm plan, or intent. She is future-oriented, feels responsibility for her family, and has no hx of SA or hospitalizations.        Assessment/Plan   Alesia Fierro is a 36 y/o CF with a hx of MDD, YUDI, and substance abuse hx. With the use of fluoxetine 20 mg anxiety and her mood has been stable.      Continue fluoxetine 40 mg to target MDD and YUDI.  Utilize the website Psychology Today to find a therapist.  Contact [facility] [address] [facility]; [place] [address] [phone] for an ADHD evaluation.      Advised to call [phone] to report any urgent concerns and/or schedule a sooner follow-up.      Provided with crisis/emergency resources, including the [facility] Crisis Hotline 1-582.528.9440, Crisis Text Line (text 2DUSA to 663682) and the National Suicide Prevention Lifeline hotline 1-186.802.9044. Agrees to call 501 or go to the nearest emergency department if s/he feels unsafe, or has suicidal thinking with a plan or intent.     Next appointment:

## 2025-06-25 ENCOUNTER — APPOINTMENT (OUTPATIENT)
Dept: BEHAVIORAL HEALTH | Facility: CLINIC | Age: 35
End: 2025-06-25
Payer: MEDICAID

## 2025-06-25 DIAGNOSIS — F33.1 MODERATE EPISODE OF RECURRENT MAJOR DEPRESSIVE DISORDER: ICD-10-CM

## 2025-06-25 PROCEDURE — 99214 OFFICE O/P EST MOD 30 MIN: CPT

## 2025-06-25 NOTE — PROGRESS NOTES
Subjective   Patient ID: Alesia Fierro is a 35 y.o. female who presents for MDD, YUDI, and substance abuse hx.     Virtual or Telephone Consent    An interactive audio and video telecommunication system which permits real time communications between the patient (at the originating site) and provider (at the distant site) was utilized to provide this telehealth service.   Verbal consent was requested and obtained from Alesia Fierro on this date, 06/25/25 for a telehealth visit and the patient's location was confirmed at the time of the visit.     HPI  With the use of fluoxetine 40 mg anxiety is manageable, she has been free of panic attacks,her mood has been stable and motivation has improved. But she notice with the use of the medication she was not able to feel excitement while at a fair and not sure currently if it is situational.  Denies thoughts of Self-harm and SI/HI/AVH.     Past psych meds:   Amphetamine -Dextroamphetamine 50 mg- increase BP/anxiety  Buspirone 10 mg - felt that she was in a fog for 6hrs  Viibryd 10 mg- ineffective  Wellbutrin 300 mg- induce more depression/ develop dry mouth  Atomoxetine 80 mg - ineffective   Cymbalta 30 mg- do not remember effectiveness  escitalopram 10 mg- do not remember effectiveness  Propranolol 10 mg - develop more palpations   Daytrana 15 mg - develop a rash  hydroxyzine 25 mg - do not remember effectiveness  quetiapine 25 mg - difficult to wake in the morning had vivid nightmares  Venlafaxine 150 mg- increase BP  sertraline- felt numb      Review of Systems   All other systems reviewed and are negative.        Objective   Physical Exam  Psychiatric:         Attention and Perception: Attention normal.         Mood and Affect: Mood normal.         Speech: Speech normal.         Behavior: Behavior is cooperative.         Thought Content: Thought content normal.         Cognition and Memory: Cognition normal.         Judgment: Judgment normal.       Acute risk of  self-harm remains low despite current stressors (acute and chronic). No reported thoughts of self-harm plan, or intent. She is future-oriented, feels responsibility for her family, and has no hx of SA or hospitalizations.          Assessment/Plan   Alesia Fierro is a 36 y/o CF with a hx of MDD, YUDI, and substance abuse hx. With the use of fluoxetine 40 mg anxiety and her mood has been stable.      Continue fluoxetine 40 mg to target MDD and YUDI.  Utilize the website Psychology Today to find a therapist.  Contact [facility] [address] [place] [address]; [place] [address] [phone] for an ADHD evaluation.      Advised to call [phone] to report any urgent concerns and/or schedule a sooner follow-up.      Provided with crisis/emergency resources, including the [facility] Crisis Hotline 1-770.147.5813, Crisis Text Line (text 5NJCQ to 987796) and the National Suicide Prevention Lifeline hotline 1-767.329.7881. Agrees to call 911 or go to the nearest emergency department if s/he feels unsafe, or has suicidal thinking with a plan or intent.     Next appointment: 7/30 at 9:30

## 2025-07-30 ENCOUNTER — APPOINTMENT (OUTPATIENT)
Dept: BEHAVIORAL HEALTH | Facility: CLINIC | Age: 35
End: 2025-07-30
Payer: MEDICAID

## 2025-07-30 DIAGNOSIS — F33.1 MODERATE EPISODE OF RECURRENT MAJOR DEPRESSIVE DISORDER: ICD-10-CM

## 2025-07-30 PROCEDURE — 99213 OFFICE O/P EST LOW 20 MIN: CPT

## 2025-07-30 NOTE — PROGRESS NOTES
Subjective   Patient ID: Alesia Fierro is a 35 y.o. female who presents for  MDD, YUDI, and substance abuse hx.     Virtual or Telephone Consent    An interactive audio and video telecommunication system which permits real time communications between the patient (at the originating site) and provider (at the distant site) was utilized to provide this telehealth service.   Verbal consent was requested and obtained from Alesia Fierro on this date, 07/30/25 for a telehealth visit and the patient's location was confirmed at the time of the visit.     HPI  Since we last met she has been forced to move d/t an eviction. But she is viewing it as a blessing in disguise d/t finding mold in the home. Her mother recently learned that she has cancer that has metatasized which is painful because she states the relationship with her my mom is the only one she has with her maternal family.  Currently her mood is stable but feels anxious.   Denies thoughts of Self-harm and SI/HI/AVH.      Past psych meds:   Amphetamine -Dextroamphetamine 50 mg- increase BP/anxiety  Buspirone 10 mg - felt that she was in a fog for 6hrs  Viibryd 10 mg- ineffective  Wellbutrin 300 mg- induce more depression/ develop dry mouth  Atomoxetine 80 mg - ineffective   Cymbalta 30 mg- do not remember effectiveness  escitalopram 10 mg- do not remember effectiveness  Propranolol 10 mg - develop more palpations   Daytrana 15 mg - develop a rash  hydroxyzine 25 mg - do not remember effectiveness  quetiapine 25 mg - difficult to wake in the morning had vivid nightmares  Venlafaxine 150 mg- increase BP  sertraline- felt numb     Review of Systems   All other systems reviewed and are negative.        Objective   Physical Exam    Psychiatric:         Attention and Perception: Attention normal.         Mood and Affect: Mood is anxious.         Speech: Speech normal.         Behavior: Behavior is cooperative.         Thought Content: Thought content normal.          Cognition and Memory: Cognition normal.         Judgment: Judgment normal.       Acute risk of self-harm remains low despite current stressors (acute and chronic). No reported thoughts of self-harm plan, or intent. She is future-oriented, feels responsibility for her family, and has no hx of SA or hospitalizations.       Assessment/Plan   Alesia Fierro is a 36 y/o CF with a hx of MDD, YUDI, and substance abuse hx. With the use of fluoxetine 40 mg anxiety and her mood has been stable.      Continue fluoxetine 40 mg to target MDD and YUDI.  Utilize the website Psychology Today to find a therapist.  Contact [facility] [address] [facility]; [place] [address] [phone] for an ADHD evaluation.      Advised to call [phone] to report any urgent concerns and/or schedule a sooner follow-up.      Provided with crisis/emergency resources, including the [facility] Crisis Hotline 1-272.718.9364, Crisis Text Line (text 8FSAF vb 605514) and the National Suicide Prevention Lifeline hotline 1-153.190.7062. Agrees to call 071 or go to the nearest emergency department if s/he feels unsafe, or has suicidal thinking with a plan or intent.     Next appointment: 8/18 at 9:30

## 2025-08-18 ENCOUNTER — APPOINTMENT (OUTPATIENT)
Dept: BEHAVIORAL HEALTH | Facility: CLINIC | Age: 35
End: 2025-08-18
Payer: MEDICAID

## 2025-08-18 DIAGNOSIS — F33.1 MODERATE EPISODE OF RECURRENT MAJOR DEPRESSIVE DISORDER: ICD-10-CM

## 2025-08-18 PROCEDURE — 99214 OFFICE O/P EST MOD 30 MIN: CPT

## 2025-08-18 RX ORDER — FLUOXETINE HYDROCHLORIDE 60 MG/1
60 TABLET, FILM COATED ORAL; ORAL DAILY
Qty: 90 TABLET | Refills: 0 | Status: SHIPPED | OUTPATIENT
Start: 2025-08-18 | End: 2025-11-16

## 2025-09-18 ENCOUNTER — APPOINTMENT (OUTPATIENT)
Dept: BEHAVIORAL HEALTH | Facility: CLINIC | Age: 35
End: 2025-09-18
Payer: MEDICAID